# Patient Record
Sex: FEMALE | Race: WHITE | Employment: FULL TIME | URBAN - METROPOLITAN AREA
[De-identification: names, ages, dates, MRNs, and addresses within clinical notes are randomized per-mention and may not be internally consistent; named-entity substitution may affect disease eponyms.]

---

## 2017-02-02 ENCOUNTER — PATIENT MESSAGE (OUTPATIENT)
Dept: INTERNAL MEDICINE CLINIC | Age: 52
End: 2017-02-02

## 2017-11-07 ENCOUNTER — OFFICE VISIT (OUTPATIENT)
Dept: INTERNAL MEDICINE CLINIC | Age: 52
End: 2017-11-07

## 2017-11-07 VITALS
WEIGHT: 134 LBS | DIASTOLIC BLOOD PRESSURE: 81 MMHG | HEART RATE: 73 BPM | SYSTOLIC BLOOD PRESSURE: 133 MMHG | OXYGEN SATURATION: 92 % | HEIGHT: 66 IN | BODY MASS INDEX: 21.53 KG/M2

## 2017-11-07 DIAGNOSIS — M79.644 CHRONIC PAIN OF RIGHT THUMB: ICD-10-CM

## 2017-11-07 DIAGNOSIS — Z13.1 SCREENING FOR DIABETES MELLITUS: ICD-10-CM

## 2017-11-07 DIAGNOSIS — G89.29 CHRONIC PAIN OF RIGHT THUMB: ICD-10-CM

## 2017-11-07 DIAGNOSIS — Z13.220 LIPID SCREENING: ICD-10-CM

## 2017-11-07 DIAGNOSIS — Z00.00 ANNUAL PHYSICAL EXAM: Primary | ICD-10-CM

## 2017-11-07 DIAGNOSIS — G89.29 CHRONIC LEFT-SIDED LOW BACK PAIN WITH LEFT-SIDED SCIATICA: ICD-10-CM

## 2017-11-07 DIAGNOSIS — M54.42 CHRONIC LEFT-SIDED LOW BACK PAIN WITH LEFT-SIDED SCIATICA: ICD-10-CM

## 2017-11-07 DIAGNOSIS — M79.652 LEFT THIGH PAIN: ICD-10-CM

## 2017-11-07 LAB
A/G RATIO: 1.5 (ref 1.1–2.2)
ALBUMIN SERPL-MCNC: 4.7 G/DL (ref 3.4–5)
ALP BLD-CCNC: 84 U/L (ref 40–129)
ALT SERPL-CCNC: 14 U/L (ref 10–40)
ANION GAP SERPL CALCULATED.3IONS-SCNC: 16 MMOL/L (ref 3–16)
AST SERPL-CCNC: 19 U/L (ref 15–37)
BILIRUB SERPL-MCNC: 0.3 MG/DL (ref 0–1)
BUN BLDV-MCNC: 16 MG/DL (ref 7–20)
CALCIUM SERPL-MCNC: 10.4 MG/DL (ref 8.3–10.6)
CHLORIDE BLD-SCNC: 99 MMOL/L (ref 99–110)
CHOLESTEROL, TOTAL: 197 MG/DL (ref 0–199)
CO2: 25 MMOL/L (ref 21–32)
CREAT SERPL-MCNC: 0.6 MG/DL (ref 0.6–1.1)
GFR AFRICAN AMERICAN: >60
GFR NON-AFRICAN AMERICAN: >60
GLOBULIN: 3.2 G/DL
GLUCOSE BLD-MCNC: 85 MG/DL (ref 70–99)
HCT VFR BLD CALC: 40.8 % (ref 36–48)
HDLC SERPL-MCNC: 100 MG/DL (ref 40–60)
HEMOGLOBIN: 13.7 G/DL (ref 12–16)
LDL CHOLESTEROL CALCULATED: 79 MG/DL
MCH RBC QN AUTO: 31.3 PG (ref 26–34)
MCHC RBC AUTO-ENTMCNC: 33.5 G/DL (ref 31–36)
MCV RBC AUTO: 93.6 FL (ref 80–100)
PDW BLD-RTO: 12.4 % (ref 12.4–15.4)
PLATELET # BLD: 289 K/UL (ref 135–450)
PMV BLD AUTO: 8.9 FL (ref 5–10.5)
POTASSIUM SERPL-SCNC: 4.3 MMOL/L (ref 3.5–5.1)
RBC # BLD: 4.36 M/UL (ref 4–5.2)
SODIUM BLD-SCNC: 140 MMOL/L (ref 136–145)
TOTAL PROTEIN: 7.9 G/DL (ref 6.4–8.2)
TRIGL SERPL-MCNC: 89 MG/DL (ref 0–150)
VLDLC SERPL CALC-MCNC: 18 MG/DL
WBC # BLD: 5.6 K/UL (ref 4–11)

## 2017-11-07 PROCEDURE — 99396 PREV VISIT EST AGE 40-64: CPT | Performed by: INTERNAL MEDICINE

## 2017-11-07 PROCEDURE — 99213 OFFICE O/P EST LOW 20 MIN: CPT | Performed by: INTERNAL MEDICINE

## 2017-11-07 ASSESSMENT — PATIENT HEALTH QUESTIONNAIRE - PHQ9
2. FEELING DOWN, DEPRESSED OR HOPELESS: 0
SUM OF ALL RESPONSES TO PHQ9 QUESTIONS 1 & 2: 0
1. LITTLE INTEREST OR PLEASURE IN DOING THINGS: 0
SUM OF ALL RESPONSES TO PHQ QUESTIONS 1-9: 0

## 2017-11-07 NOTE — PATIENT INSTRUCTIONS
Physical Therapy Facilities:  Isaura Leonard PT  [phone] [place] [phone] [facility])    [facility] [phone]    [facility])  [phone] Lavonne Pandey ([address]) [facility]  [phone]    [address] 1330  [phone] appointments [phone])    [facility]  [phone]    Juliana Alarcon PT(myofascial release/fibromyalgia)  [phone]        Recommendations for optimal health:  Be sure you are exercising at least 30 minutes  or 10,000 steps daily. Ideally you should try to get a mix of cardio and strength exercises. Work on [employer] For more detailed information, visit Nutrition Source web site- knowledge for healthy eating from [facility]. [facility]      If your are using supplements, look for \"USP verified\" on the label. Helps to assure they are good quality. Vitamin D 800 units daily. 8 Nutrition Tips for Eating Right:  1. Choose good carbs, not no carbs. Whole grains are your best bet. 2. Pay attention to the protein package. Fish, poultry, nuts, and beans are the best choices. 3. Choose foods with healthy fats, limit foods high in saturated fat, and avoid foods with trans fat. Plant oils, nuts, and fish are the healthiest sources. 4. Choose a fiber-filled diet, rich in whole grains, vegetables, and fruits. 5. Eat more vegetables and fruits. Go for color and varietydark green, yellow, orange, and red. 6. Calcium is important. But milk isnt the only, or even best, source. 7. Water is best to quench your thirst. Skip the sugary drinks, and go easy on the milk and juice. 8. Eating less salt is good for everyones health. Choose more fresh foods and fewer processed foods. Aim for 2-3 cups of vegetables daily and 1 1/2-2 cups of fruits daily.

## 2017-11-07 NOTE — PROGRESS NOTES
History and Physical      Isabelle Contreras  : 1965    Date of Service: 2017    Chief Complaint:   Isabelle Contreras is a 45 y.o. female who presents for complete physical examination. HPI:  Also with complaint about left leg and thigh, and right thumb   Thumb pain, right, for some time. Hard to bend. Clicks. Worse when lays down. Is right handed. Gets stuck sometimes. No injury. Also left leg pain. Improved since stopped wearing orthotics. Had been wearing since  March. Feet good now. Left thigh with deep bone pain at times, bothers most when walks. No numbness or weakness Has improved significantly since stopped using lifts/orthotics. Also with discomfort across low back at times into left buttock and leg. Exercise: works at [employer]. Active at work. Patient's last menstrual period was 2013. Patient Care Team:  Lilly rOozco MD as PCP - General (Internal Medicine)    Health Maintenance   Topic Date Due    Hepatitis C screen  2017    Flu vaccine (1) 2017    Breast cancer screen  2019    DTaP/Tdap/Td vaccine (2 - Td) 2020    Lipid screen  06/10/2020    Cervical cancer screen  2021    Colon cancer screen colonoscopy  2023    HIV screen  Completed       Immunization History   Administered Date(s) Administered    Influenza Virus Vaccine 2011    Tdap (Boostrix, Adacel) 2010       No Known Allergies    Outpatient Prescriptions Marked as Taking for the 17 encounter (Office Visit) with Lilly Orozco MD   Medication Sig Dispense Refill    Naproxen Sodium (ALEVE PO) Take by mouth      aspirin 325 MG tablet Take 325 mg by mouth 3 times daily as needed for Pain      ibuprofen (ADVIL;MOTRIN) 200 MG tablet Take 200 mg by mouth as needed for Pain      Multiple Vitamin (MULTIVITAMIN PO) Take  by mouth daily.          Past Medical History:   Diagnosis Date    Fe deficiency anemia     Fibroids     HSV-2 infection      History

## 2017-11-08 LAB
ESTIMATED AVERAGE GLUCOSE: 108.3 MG/DL
HBA1C MFR BLD: 5.4 %

## 2017-11-08 PROCEDURE — 90471 IMMUNIZATION ADMIN: CPT | Performed by: INTERNAL MEDICINE

## 2017-11-08 PROCEDURE — 90688 IIV4 VACCINE SPLT 0.5 ML IM: CPT | Performed by: INTERNAL MEDICINE

## 2018-11-26 ENCOUNTER — PATIENT MESSAGE (OUTPATIENT)
Dept: INTERNAL MEDICINE CLINIC | Age: 53
End: 2018-11-26

## 2019-10-08 ENCOUNTER — HOSPITAL ENCOUNTER (OUTPATIENT)
Dept: WOMENS IMAGING | Age: 54
Discharge: HOME | End: 2019-10-08
Payer: COMMERCIAL

## 2019-10-08 DIAGNOSIS — Z12.31 VISIT FOR SCREENING MAMMOGRAM: ICD-10-CM

## 2019-10-08 PROCEDURE — 77063 BREAST TOMOSYNTHESIS BI: CPT

## 2019-12-06 ENCOUNTER — OFFICE VISIT (OUTPATIENT)
Dept: INTERNAL MEDICINE CLINIC | Age: 54
End: 2019-12-06
Payer: COMMERCIAL

## 2019-12-06 VITALS
BODY MASS INDEX: 21.5 KG/M2 | TEMPERATURE: 97.9 F | WEIGHT: 137 LBS | DIASTOLIC BLOOD PRESSURE: 86 MMHG | HEART RATE: 79 BPM | HEIGHT: 67 IN | SYSTOLIC BLOOD PRESSURE: 124 MMHG | OXYGEN SATURATION: 94 %

## 2019-12-06 DIAGNOSIS — R04.0 RECURRENT EPISTAXIS: ICD-10-CM

## 2019-12-06 DIAGNOSIS — Z00.00 ANNUAL PHYSICAL EXAM: Primary | ICD-10-CM

## 2019-12-06 DIAGNOSIS — Z13.220 LIPID SCREENING: ICD-10-CM

## 2019-12-06 DIAGNOSIS — Z13.1 SCREENING FOR DIABETES MELLITUS: ICD-10-CM

## 2019-12-06 PROCEDURE — 90471 IMMUNIZATION ADMIN: CPT | Performed by: INTERNAL MEDICINE

## 2019-12-06 PROCEDURE — 99396 PREV VISIT EST AGE 40-64: CPT | Performed by: INTERNAL MEDICINE

## 2019-12-06 PROCEDURE — 90686 IIV4 VACC NO PRSV 0.5 ML IM: CPT | Performed by: INTERNAL MEDICINE

## 2019-12-06 PROCEDURE — 90715 TDAP VACCINE 7 YRS/> IM: CPT | Performed by: INTERNAL MEDICINE

## 2019-12-06 PROCEDURE — 90472 IMMUNIZATION ADMIN EACH ADD: CPT | Performed by: INTERNAL MEDICINE

## 2019-12-19 ENCOUNTER — OFFICE VISIT (OUTPATIENT)
Dept: ENT CLINIC | Age: 54
End: 2019-12-19
Payer: COMMERCIAL

## 2019-12-19 VITALS
HEIGHT: 66 IN | BODY MASS INDEX: 22.02 KG/M2 | HEART RATE: 83 BPM | SYSTOLIC BLOOD PRESSURE: 147 MMHG | DIASTOLIC BLOOD PRESSURE: 91 MMHG | WEIGHT: 137 LBS | TEMPERATURE: 97 F

## 2019-12-19 DIAGNOSIS — R04.0 EPISTAXIS: Primary | ICD-10-CM

## 2019-12-19 PROCEDURE — 99203 OFFICE O/P NEW LOW 30 MIN: CPT | Performed by: OTOLARYNGOLOGY

## 2019-12-19 PROCEDURE — 30901 CONTROL OF NOSEBLEED: CPT | Performed by: OTOLARYNGOLOGY

## 2020-01-23 ENCOUNTER — OFFICE VISIT (OUTPATIENT)
Dept: ENT CLINIC | Age: 55
End: 2020-01-23
Payer: COMMERCIAL

## 2020-01-23 VITALS
HEART RATE: 94 BPM | HEIGHT: 66 IN | DIASTOLIC BLOOD PRESSURE: 94 MMHG | SYSTOLIC BLOOD PRESSURE: 137 MMHG | TEMPERATURE: 96.8 F | BODY MASS INDEX: 21.69 KG/M2 | WEIGHT: 135 LBS

## 2020-01-23 PROCEDURE — 99213 OFFICE O/P EST LOW 20 MIN: CPT | Performed by: OTOLARYNGOLOGY

## 2020-01-23 NOTE — PROGRESS NOTES
Lifestyle    Physical activity:     Days per week: Not on file     Minutes per session: Not on file    Stress: Not on file   Relationships    Social connections:     Talks on phone: Not on file     Gets together: Not on file     Attends Mandaeism service: Not on file     Active member of club or organization: Not on file     Attends meetings of clubs or organizations: Not on file     Relationship status: Not on file    Intimate partner violence:     Fear of current or ex partner: Not on file     Emotionally abused: Not on file     Physically abused: Not on file     Forced sexual activity: Not on file   Other Topics Concern    Not on file   Social History Narrative    Exercise: Active job and tries to walk       DRUG/FOOD ALLERGIES: Patient has no known allergies. CURRENT MEDICATIONS  Prior to Admission medications    Medication Sig Start Date End Date Taking? Authorizing Provider   Cholecalciferol (VITAMIN D3 PO) Take by mouth    Historical Provider, MD   aspirin 325 MG tablet Take 325 mg by mouth 3 times daily as needed for Pain    Historical Provider, MD   ibuprofen (ADVIL;MOTRIN) 200 MG tablet Take 200 mg by mouth as needed for Pain    Historical Provider, MD   Multiple Vitamin (MULTIVITAMIN PO) Take  by mouth daily.     Historical Provider, MD       REVIEW OF SYSTEMS  The following systems were reviewed and revealed the following in addition to any already discussed in the HPI:    CONSTITUTIONAL: no weight loss, no fever, no night sweats, no chills  EYES: no vision changes, no blurry vision  EARS: no changes in hearing, no otalgia  NOSE: Epistaxis  RESPIRATORY: no  Difficulty breathing, no shortness of breath  CV: no chest pain, no Peripheral vascular disease  HEME: No coagulation disorder, no Bleeding disorder  NEURO: no TIA or stroke-like symptoms  SKIN: No new rashes in the head and neck, no recent skin cancers  MOUTH: No new ulcers, no recent teeth infections  GASTROINTESTINAL: No diarrhea, recauterize the area. I have performed a head and neck physical exam personally or was physically present during the key or critical portions of the service. Medical Decision Making:   The following items were considered in medical decision making:  Independent review of images  Review / order clinical lab tests  Review / order radiology tests  Decision to obtain old records

## 2020-11-09 ENCOUNTER — HOSPITAL ENCOUNTER (OUTPATIENT)
Dept: WOMENS IMAGING | Age: 55
Discharge: HOME | End: 2020-11-09
Payer: COMMERCIAL

## 2020-11-09 PROCEDURE — 77063 BREAST TOMOSYNTHESIS BI: CPT

## 2021-03-25 ENCOUNTER — IMMUNIZATION (OUTPATIENT)
Dept: PRIMARY CARE CLINIC | Age: 56
End: 2021-03-25
Payer: COMMERCIAL

## 2021-03-25 PROCEDURE — 0011A PR IMM ADMN SARSCOV2 100 MCG/0.5 ML 1ST DOSE: CPT | Performed by: FAMILY MEDICINE

## 2021-03-25 PROCEDURE — 91301 COVID-19, MODERNA VACCINE 100MCG/0.5ML DOSE: CPT | Performed by: FAMILY MEDICINE

## 2021-04-22 ENCOUNTER — IMMUNIZATION (OUTPATIENT)
Dept: PRIMARY CARE CLINIC | Age: 56
End: 2021-04-22
Payer: COMMERCIAL

## 2021-04-22 PROCEDURE — 91301 COVID-19, MODERNA VACCINE 100MCG/0.5ML DOSE: CPT | Performed by: FAMILY MEDICINE

## 2021-04-22 PROCEDURE — 0012A PR IMM ADMN SARSCOV2 100 MCG/0.5 ML 2ND DOSE: CPT | Performed by: FAMILY MEDICINE

## 2021-08-03 ENCOUNTER — OFFICE VISIT (OUTPATIENT)
Dept: INTERNAL MEDICINE CLINIC | Age: 56
End: 2021-08-03
Payer: COMMERCIAL

## 2021-08-03 VITALS
HEIGHT: 67 IN | DIASTOLIC BLOOD PRESSURE: 74 MMHG | SYSTOLIC BLOOD PRESSURE: 144 MMHG | OXYGEN SATURATION: 100 % | HEART RATE: 94 BPM | BODY MASS INDEX: 20.94 KG/M2 | WEIGHT: 133.4 LBS

## 2021-08-03 DIAGNOSIS — R03.0 ELEVATED BLOOD PRESSURE READING WITHOUT DIAGNOSIS OF HYPERTENSION: ICD-10-CM

## 2021-08-03 DIAGNOSIS — Z00.00 ANNUAL PHYSICAL EXAM: Primary | ICD-10-CM

## 2021-08-03 DIAGNOSIS — Z13.220 LIPID SCREENING: ICD-10-CM

## 2021-08-03 DIAGNOSIS — Z12.4 PAP SMEAR FOR CERVICAL CANCER SCREENING: ICD-10-CM

## 2021-08-03 DIAGNOSIS — Z13.1 SCREENING FOR DIABETES MELLITUS: ICD-10-CM

## 2021-08-03 DIAGNOSIS — Z23 NEED FOR SHINGLES VACCINE: ICD-10-CM

## 2021-08-03 PROCEDURE — 99396 PREV VISIT EST AGE 40-64: CPT | Performed by: INTERNAL MEDICINE

## 2021-08-03 PROCEDURE — 90471 IMMUNIZATION ADMIN: CPT | Performed by: INTERNAL MEDICINE

## 2021-08-03 PROCEDURE — 90750 HZV VACC RECOMBINANT IM: CPT | Performed by: INTERNAL MEDICINE

## 2021-08-03 SDOH — ECONOMIC STABILITY: FOOD INSECURITY: WITHIN THE PAST 12 MONTHS, YOU WORRIED THAT YOUR FOOD WOULD RUN OUT BEFORE YOU GOT MONEY TO BUY MORE.: NEVER TRUE

## 2021-08-03 SDOH — ECONOMIC STABILITY: FOOD INSECURITY: WITHIN THE PAST 12 MONTHS, THE FOOD YOU BOUGHT JUST DIDN'T LAST AND YOU DIDN'T HAVE MONEY TO GET MORE.: NEVER TRUE

## 2021-08-03 ASSESSMENT — PATIENT HEALTH QUESTIONNAIRE - PHQ9
SUM OF ALL RESPONSES TO PHQ QUESTIONS 1-9: 0
2. FEELING DOWN, DEPRESSED OR HOPELESS: 0
SUM OF ALL RESPONSES TO PHQ9 QUESTIONS 1 & 2: 0
1. LITTLE INTEREST OR PLEASURE IN DOING THINGS: 0

## 2021-08-03 ASSESSMENT — SOCIAL DETERMINANTS OF HEALTH (SDOH): HOW HARD IS IT FOR YOU TO PAY FOR THE VERY BASICS LIKE FOOD, HOUSING, MEDICAL CARE, AND HEATING?: NOT HARD AT ALL

## 2021-08-03 NOTE — PATIENT INSTRUCTIONS
yogurt, or 1 ½ ounces of cheese. · Eat 6 to 8 servings of grains each day. A serving is 1 slice of bread, 1 ounce of dry cereal, or ½ cup of cooked rice, pasta, or cooked cereal. Try to choose whole-grain products as much as possible. · Limit lean meat, poultry, and fish to 2 servings each day. A serving is 3 ounces, about the size of a deck of cards. · Eat 4 to 5 servings of nuts, seeds, and legumes (cooked dried beans, lentils, and split peas) each week. A serving is 1/3 cup of nuts, 2 tablespoons of seeds, or ½ cup of cooked beans or peas. · Limit fats and oils to 2 to 3 servings each day. A serving is 1 teaspoon of vegetable oil or 2 tablespoons of salad dressing. · Limit sweets and added sugars to 5 servings or less a week. A serving is 1 tablespoon jelly or jam, ½ cup sorbet, or 1 cup of lemonade. · Eat less than 2,300 milligrams (mg) of sodium a day. If you limit your sodium to 1,500 mg a day, you can lower your blood pressure even more. · Be aware that all of these are the suggested number of servings for people who eat 1,800 to 2,000 calories a day. Your recommended number of servings may be different if you need more or fewer calories. Tips for success  · Start small. Do not try to make dramatic changes to your diet all at once. You might feel that you are missing out on your favorite foods and then be more likely to not follow the plan. Make small changes, and stick with them. Once those changes become habit, add a few more changes. · Try some of the following:  ? Make it a goal to eat a fruit or vegetable at every meal and at snacks. This will make it easy to get the recommended amount of fruits and vegetables each day. ? Try yogurt topped with fruit and nuts for a snack or healthy dessert. ? Add lettuce, tomato, cucumber, and onion to sandwiches. ? Combine a ready-made pizza crust with low-fat mozzarella cheese and lots of vegetable toppings.  Try using tomatoes, squash, spinach, broccoli, carrots, cauliflower, and onions. ? Have a variety of cut-up vegetables with a low-fat dip as an appetizer instead of chips and dip. ? Sprinkle sunflower seeds or chopped almonds over salads. Or try adding chopped walnuts or almonds to cooked vegetables. ? Try some vegetarian meals using beans and peas. Add garbanzo or kidney beans to salads. Make burritos and tacos with mashed de leon beans or black beans. Where can you learn more? Go to [email or web address] org and sign in to your [employer] account. Enter K857 in the [facility] box to learn more about \"DASH Diet: Care Instructions. \"     If you do not have an account, please click on the \"Sign Up Now\" link. Current as of: August 31, 2020               Content Version: 12.9  © 7773-6106 Healthwise, [facility]. Care instructions adapted under license by [facility] ([facility]). If you have questions about a medical condition or this instruction, always ask your healthcare professional. [place] [address] any warranty or liability for your use of this information.

## 2021-08-03 NOTE — PROGRESS NOTES
History and Physical    Karl Gao  : 1965    Date of Service: 8/3/2021    Chief Complaint:   Karl Gao is a 60 y.o. female who presents for complete physical examination. Diet: lots of whole foods, needs more fruits and vegetables. Exercise: works at [employer]. Active at work. Patient's last menstrual period was 2013. Minimal menopausal symptoms, mainly gets warm at night, but manageable. Patient Care Team:  Yogesh Smallwood MD as PCP - General (Internal Medicine)  Yogesh Smallwood MD as PCP - [facility]    Health Maintenance   Topic Date Due    Cervical cancer screen  2021    Flu vaccine (1) 2021    Shingles Vaccine (2 of 2) 2021    Breast cancer screen  2022    Colon cancer screen colonoscopy  2023    Lipid screen  2024    DTaP/Tdap/Td vaccine (3 - Td or Tdap) 2029    COVID-19 Vaccine  Completed    Hepatitis C screen  Completed    HIV screen  Completed    Hepatitis A vaccine  Aged Out    Hepatitis B vaccine  Aged Out    Hib vaccine  Aged Out    Meningococcal (ACWY) vaccine  Aged Out    Pneumococcal 0-64 years Vaccine  Aged [employer] History   Administered Date(s) Administered    COVID-19, Moderna, PF, 100mcg/0.5mL 2021, 2021    Influenza Virus Vaccine 2011    Influenza, Quadv, 6 mo and older, IM (Fluzone, Flulaval) 2017    Influenza, Quadv, IM, PF (6 mo and older Fluzone, Flulaval, Fluarix, and 3 yrs and older Afluria) 2019    Tdap (Boostrix, Adacel) 2010, 2019    Zoster Recombinant (Shingrix) 2021       No Known Allergies    Outpatient Medications Marked as Taking for the 8/3/21 encounter (Office Visit) with Yogesh Smallwood MD   Medication Sig Dispense Refill    BIOTIN PO Take by mouth         Past Medical History:   Diagnosis Date    Fe deficiency anemia     Fibroids     HSV-2 infection      History reviewed.  No pertinent surgical history. Family History   Problem Relation Age of Onset    Hypertension Mother     High Cholesterol Mother     Coronary Art Dis Father         MI, CABG, defibrillator,  at 80 with CHF    High Cholesterol Father     Diabetes Father 80    Colon Polyps Brother     Other Brother         congenital unilateral kidney    Ulcerative Colitis Brother     Crohn's Disease Maternal Aunt      Social History     Socioeconomic History    Marital status:      Spouse name: Not on file    Number of children: 0    Years of education: Not on file    Highest education level: Not on file   Occupational History    Occupation: [employer] sales   Tobacco Use    Smoking status: Never Smoker    Smokeless tobacco: Never Used   Substance and Sexual Activity    Alcohol use: Yes     Alcohol/week: 7.0 standard drinks     Types: 7 Cans of beer per week     Comment: 1-2 beers daily    Drug use: Yes     Comment: Marijuana 1-2x/month    Sexual activity: Yes     Partners: Male   Other Topics Concern    Not on file   Social History Narrative    Exercise: Active job and tries to walk     Social Determinants of Health     Financial Resource Strain: Low Risk     Difficulty of Paying Living Expenses: Not hard at all   Food Insecurity: No Food Insecurity    Worried About Running Out of Food in the Last Year: Never true    [address] in the Last Year: Never true   Transportation Needs:     Lack of Transportation (Medical):      Lack of Transportation (Non-Medical):    Physical Activity:     Days of Exercise per Week:     Minutes of Exercise per Session:    Stress:     Feeling of Stress :    Social Connections:     Frequency of Communication with Friends and Family:     Frequency of Social Gatherings with Friends and Family:     Attends Restorationist Services:     Active Member of Clubs or Organizations:     Attends Club or Organization Meetings:     Marital Status:    Intimate Partner Violence:     Fear of Current or Ex-Partner:     Emotionally Abused:     Physically Abused:     Sexually Abused:        Review of Systems:  As documented in HPI and patient questionnaire (scanned)    Wt Readings from Last 3 Encounters:   08/03/21 133 lb 6.4 oz (60.5 kg)   01/23/20 135 lb (61.2 kg)   12/19/19 137 lb (62.1 kg)     BP Readings from Last 3 Encounters:   08/03/21 (!) 144/74   01/23/20 (!) 137/94   12/19/19 (!) 147/91       Physical Exam:   Vitals:    08/03/21 1419 08/03/21 1427   BP: (!) 146/74 (!) 144/74   Site: Right Upper Arm Left Upper Arm   Pulse: 94    SpO2: 100%    Weight: 133 lb 6.4 oz (60.5 kg)    Height: 5' 7\" (1.702 m)      Body mass index is 20.89 kg/m². Physical Exam  Constitutional:       Appearance: Normal appearance. She is well-developed and normal weight. HENT:      Head: Normocephalic and atraumatic. Right Ear: Tympanic membrane, ear canal and external ear normal.      Left Ear: Tympanic membrane, ear canal and external ear normal.      Nose: No nasal deformity, signs of injury, laceration or mucosal edema. Right Nostril: No epistaxis or septal hematoma. Right Turbinates: Not enlarged. Eyes:      Conjunctiva/sclera: Conjunctivae normal.      Pupils: Pupils are equal, round, and reactive to light. Neck:      Thyroid: No thyromegaly. Cardiovascular:      Rate and Rhythm: Normal rate and regular rhythm. Heart sounds: No murmur heard. Pulmonary:      Effort: Pulmonary effort is normal.      Breath sounds: Normal breath sounds. Chest:      Breasts: Breasts are symmetrical.         Right: No inverted nipple, mass, nipple discharge, skin change or tenderness. Left: No inverted nipple, mass, nipple discharge, skin change or tenderness. Abdominal:      General: Bowel sounds are normal.      Palpations: Abdomen is soft. There is no hepatomegaly, splenomegaly or mass. Tenderness: There is no abdominal tenderness. Genitourinary:     Exam position: Supine.       Vagina:

## 2021-08-03 NOTE — ASSESSMENT & PLAN NOTE
Discussed management with lifestyle modification. .  Suggest home blood pressure cuff for monitoring. Provided info on DASH diet. Encourage stop drinking and determine impact once off for 1 week. Dami Ley

## 2021-08-04 LAB
HPV COMMENT: NORMAL
HPV TYPE 16: NOT DETECTED
HPV TYPE 18: NOT DETECTED
HPVOH (OTHER TYPES): NOT DETECTED

## 2021-12-07 ENCOUNTER — OFFICE VISIT (OUTPATIENT)
Dept: INTERNAL MEDICINE CLINIC | Age: 56
End: 2021-12-07
Payer: COMMERCIAL

## 2021-12-07 VITALS
BODY MASS INDEX: 21.6 KG/M2 | SYSTOLIC BLOOD PRESSURE: 156 MMHG | OXYGEN SATURATION: 99 % | RESPIRATION RATE: 20 BRPM | HEIGHT: 66 IN | DIASTOLIC BLOOD PRESSURE: 86 MMHG | HEART RATE: 99 BPM | WEIGHT: 134.4 LBS

## 2021-12-07 DIAGNOSIS — I10 ESSENTIAL HYPERTENSION: Primary | ICD-10-CM

## 2021-12-07 PROCEDURE — 99213 OFFICE O/P EST LOW 20 MIN: CPT | Performed by: INTERNAL MEDICINE

## 2021-12-07 PROCEDURE — 90750 HZV VACC RECOMBINANT IM: CPT | Performed by: INTERNAL MEDICINE

## 2021-12-07 PROCEDURE — 90471 IMMUNIZATION ADMIN: CPT | Performed by: INTERNAL MEDICINE

## 2021-12-07 RX ORDER — HYDROCHLOROTHIAZIDE 12.5 MG/1
12.5 CAPSULE, GELATIN COATED ORAL DAILY
Qty: 30 CAPSULE | Refills: 3 | Status: SHIPPED | OUTPATIENT
Start: 2021-12-07 | End: 2022-01-06 | Stop reason: SDUPTHER

## 2021-12-07 NOTE — PROGRESS NOTES
Dominic Polanco   :  1965  2021    ASSESSMENT/PLAN:   Essential hypertension  Assessment & Plan: This is a new diagnosis. Persistent despite of optimal management of lifestyle. Start treatment low-dose HCTZ 12.5 mg daily. Discussed risks and benefits of the medication, including most common side effects. Monitor home blood pressure readings and report back in approximately 2 to 3 weeks. Patient also to ask  about any possible apnea. Does snore but no daytime hypersomnolence and body habitus would be atypical for obstructive sleep apnea with BMI of 21. Orders:  -     hydroCHLOROthiazide (MICROZIDE) 12.5 MG capsule; Take 1 capsule by mouth daily, Disp-30 capsule, R-3Normal    Shingrix #2 today. Return in about 2 months (around 2022) for HTN, lab. SUBJECTIVE:  60 y.o. female established patient here for:   Chief Complaint   Patient presents with    Follow-up     check bp, 2nd shingles, flu due      Lowest at home was 737 systolic, and high in 305'G. Mostly 140-150's. Limited alcohol to 1 beer daily. Didn't make a difference. First migraine back in September. Woke up with terrible headache, got worse, episode of vomiting. No since. Still nosebleeds even though cauterized last year. Does snore. Review of Systems    Outpatient Medications Marked as Taking for the 21 encounter (Office Visit) with Linda Easton MD   Medication Sig Dispense Refill    hydroCHLOROthiazide (MICROZIDE) 12.5 MG capsule Take 1 capsule by mouth daily 30 capsule 3    BIOTIN PO Take by mouth      Cholecalciferol (VITAMIN D3 PO) Take by mouth      Multiple Vitamin (MULTIVITAMIN PO) Take  by mouth daily. OBJECTIVE:  Vitals:    21 1232 21 1417   BP: (!) 150/86 (!) 156/86   Pulse: 99    Resp: 20    SpO2: 99%    Weight: 134 lb 6.4 oz (61 kg)    Height: 5' 6\" (1.676 m)      Physical Exam  Constitutional:       Appearance: Normal appearance.    Cardiovascular:      Rate and Rhythm: Normal rate and regular rhythm. Pulmonary:      Effort: Pulmonary effort is normal.      Breath sounds: Normal breath sounds. Musculoskeletal:      Right lower leg: No edema. Left lower leg: No edema. This note was generated completely or in part utilizing Dragon dictation speech recognition software. Occasionally, words are mistranscribed and despite editing, the text may contain inaccuracies due to incorrect word recognition.   If further clarification is needed please contact the office at [phone]  --Sarah Mahoney MD

## 2021-12-14 ENCOUNTER — HOSPITAL ENCOUNTER (OUTPATIENT)
Dept: WOMENS IMAGING | Age: 56
Discharge: HOME | End: 2021-12-14
Payer: COMMERCIAL

## 2021-12-14 DIAGNOSIS — Z12.31 BREAST CANCER SCREENING BY MAMMOGRAM: ICD-10-CM

## 2021-12-14 PROCEDURE — 77063 BREAST TOMOSYNTHESIS BI: CPT

## 2022-01-06 DIAGNOSIS — I10 ESSENTIAL HYPERTENSION: ICD-10-CM

## 2022-01-06 RX ORDER — HYDROCHLOROTHIAZIDE 12.5 MG/1
12.5 CAPSULE, GELATIN COATED ORAL DAILY
Qty: 30 CAPSULE | Refills: 3 | Status: SHIPPED | OUTPATIENT
Start: 2022-01-06 | End: 2022-01-21 | Stop reason: SDUPTHER

## 2022-01-21 ENCOUNTER — TELEPHONE (OUTPATIENT)
Dept: INTERNAL MEDICINE CLINIC | Age: 57
End: 2022-01-21

## 2022-01-21 DIAGNOSIS — I10 ESSENTIAL HYPERTENSION: ICD-10-CM

## 2022-01-21 RX ORDER — HYDROCHLOROTHIAZIDE 12.5 MG/1
12.5 CAPSULE, GELATIN COATED ORAL DAILY
Qty: 90 CAPSULE | Refills: 1 | Status: SHIPPED | OUTPATIENT
Start: 2022-01-21 | End: 2022-06-24

## 2022-01-21 NOTE — TELEPHONE ENCOUNTER
Placed call to pt to ask if she gets rx filled at [address] or [place]. It was refilled on 1/6/22.

## 2022-02-15 ENCOUNTER — OFFICE VISIT (OUTPATIENT)
Dept: INTERNAL MEDICINE CLINIC | Age: 57
End: 2022-02-15
Payer: COMMERCIAL

## 2022-02-15 VITALS
WEIGHT: 135.6 LBS | BODY MASS INDEX: 21.89 KG/M2 | OXYGEN SATURATION: 98 % | DIASTOLIC BLOOD PRESSURE: 80 MMHG | SYSTOLIC BLOOD PRESSURE: 130 MMHG | RESPIRATION RATE: 14 BRPM | HEART RATE: 64 BPM

## 2022-02-15 DIAGNOSIS — I10 ESSENTIAL HYPERTENSION: Primary | ICD-10-CM

## 2022-02-15 PROCEDURE — 99213 OFFICE O/P EST LOW 20 MIN: CPT | Performed by: INTERNAL MEDICINE

## 2022-02-15 RX ORDER — TRIAMTERENE AND HYDROCHLOROTHIAZIDE 37.5; 25 MG/1; MG/1
1 CAPSULE ORAL EVERY MORNING
Qty: 90 CAPSULE | Refills: 0 | Status: SHIPPED | OUTPATIENT
Start: 2022-02-15 | End: 2022-02-25 | Stop reason: SDUPTHER

## 2022-02-15 NOTE — PROGRESS NOTES
Niurka Hercules   :  1965  2/15/2022    ASSESSMENT/PLAN:   Essential hypertension  Assessment & Plan:   Improved, but not at goal yet. DC HCTZ 12.5. Start Maxide 25. Asked patient to send blood pressure readings in 2 to 3 weeks. Return in about 6 months (around 8/15/2022) for CPE. SUBJECTIVE:  60 y.o. female established patient here for:   Chief Complaint   Patient presents with    Follow-up     blood pressure ck      Was started on hctz at last appointment for hypertension. Home readings show blood pressure in yellow zone\", she thinks 172P systolic. Once in green. No side effect with medication    Review of Systems    Outpatient Medications Marked as Taking for the 2/15/22 encounter (Office Visit) with Alex Fitch MD   Medication Sig Dispense Refill    GARLIC PO Take 1 tablet by mouth daily      triamterene-hydroCHLOROthiazide (DYAZIDE) 37.5-25 MG per capsule Take 1 capsule by mouth every morning 90 capsule 0    hydroCHLOROthiazide (MICROZIDE) 12.5 MG capsule Take 1 capsule by mouth daily 90 capsule 1    Cholecalciferol (VITAMIN D3 PO) Take by mouth      Multiple Vitamin (MULTIVITAMIN PO) Take  by mouth daily. OBJECTIVE:  Vitals:    02/15/22 1259 02/15/22 1318   BP: 138/80 130/80   Site: Right Upper Arm Right Upper Arm   Pulse: 64    Resp: 14    SpO2: 98%    Weight: 135 lb 9.6 oz (61.5 kg)      Physical Exam  Constitutional:       Appearance: Normal appearance. Cardiovascular:      Rate and Rhythm: Normal rate and regular rhythm. Pulmonary:      Effort: Pulmonary effort is normal.      Breath sounds: Normal breath sounds. Musculoskeletal:      Right lower leg: No edema. Left lower leg: No edema. This note was generated completely or in part utilizing Dragon dictation speech recognition software. Occasionally, words are mistranscribed and despite editing, the text may contain inaccuracies due to incorrect word recognition.   If further clarification is needed please contact the office at [phone]  --Eloise Melissa MD

## 2022-02-25 RX ORDER — TRIAMTERENE AND HYDROCHLOROTHIAZIDE 37.5; 25 MG/1; MG/1
1 CAPSULE ORAL EVERY MORNING
Qty: 90 CAPSULE | Refills: 1 | Status: SHIPPED | OUTPATIENT
Start: 2022-02-25 | End: 2022-08-22

## 2022-06-23 ENCOUNTER — OFFICE VISIT (OUTPATIENT)
Dept: INTERNAL MEDICINE CLINIC | Age: 57
End: 2022-06-23
Payer: COMMERCIAL

## 2022-06-23 VITALS
OXYGEN SATURATION: 98 % | DIASTOLIC BLOOD PRESSURE: 72 MMHG | BODY MASS INDEX: 21.79 KG/M2 | WEIGHT: 135.6 LBS | HEART RATE: 65 BPM | HEIGHT: 66 IN | SYSTOLIC BLOOD PRESSURE: 122 MMHG

## 2022-06-23 DIAGNOSIS — R21 RASH: Primary | ICD-10-CM

## 2022-06-23 PROCEDURE — 99213 OFFICE O/P EST LOW 20 MIN: CPT | Performed by: INTERNAL MEDICINE

## 2022-06-23 RX ORDER — METHYLPREDNISOLONE 4 MG/1
TABLET ORAL
Qty: 1 KIT | Refills: 0 | Status: SHIPPED | OUTPATIENT
Start: 2022-06-23 | End: 2022-06-29

## 2022-06-23 ASSESSMENT — PATIENT HEALTH QUESTIONNAIRE - PHQ9
1. LITTLE INTEREST OR PLEASURE IN DOING THINGS: 0
2. FEELING DOWN, DEPRESSED OR HOPELESS: 0
SUM OF ALL RESPONSES TO PHQ QUESTIONS 1-9: 0
SUM OF ALL RESPONSES TO PHQ QUESTIONS 1-9: 0
SUM OF ALL RESPONSES TO PHQ9 QUESTIONS 1 & 2: 0
SUM OF ALL RESPONSES TO PHQ QUESTIONS 1-9: 0
SUM OF ALL RESPONSES TO PHQ QUESTIONS 1-9: 0

## 2022-06-23 NOTE — PROGRESS NOTES
Dino Mahoney (:  1965) is a 59 y.o. female,Established patient, here for evaluation of the following chief complaint(s):  Rash (arms & legs  x couple weeks )      Vitals:    22 1355   BP: 122/72   Pulse: 65   SpO2: 98%        ASSESSMENT/PLAN:  1. Rash  Unclear etiology of patient's rash. Patient denies any new products. Will obtain labs to evaluate for possible vasculitis. We will start Medrol Dosepak. If no improvement will need follow-up with PCP or rheumatology   -     Comprehensive Metabolic Panel; Future  -     CBC with Auto Differential; Future  -     NICOLE Reflex to Antibody Cascade; Future    PRN  SUBJECTIVE/OBJECTIVE:  HPI     Patient is a 61-year-old female with past medical history of hypertension who presents secondary to rash. Patient noted that about 1 week ago the tops of arms were slightly itchy. She tried lotion and then noticed some red blochy spots on her legs. Then also noted them on her arms. she notes then it moved down her legs to her feet. Notes the only place where she had itching was on the arms bilaterally. Legs have not been itchy. She denies any rash on her abdomen chest or back. No one else with rash within the home. Patient denies using any new soaps, detergents or any other new products. Patient is feeling well otherwise without any complaints. Review of Systems ROS negative except for those noted in the HPI above. Vitals:    22 1355   BP: 122/72   Pulse: 65   SpO2: 98%     Physical Exam  Constitutional:       Appearance: Normal appearance. HENT:      Head: Normocephalic and atraumatic. Right Ear: External ear normal.      Left Ear: External ear normal.   Eyes:      Extraocular Movements: Extraocular movements intact. Conjunctiva/sclera: Conjunctivae normal.   Cardiovascular:      Rate and Rhythm: Normal rate and regular rhythm. Heart sounds: No murmur heard. No friction rub. No gallop.     Pulmonary:      Effort: Pulmonary effort is normal. No respiratory distress. Breath sounds: Normal breath sounds. No wheezing, rhonchi or rales. Abdominal:      General: Bowel sounds are normal. There is no distension. Palpations: Abdomen is soft. There is no mass. Tenderness: There is no abdominal tenderness. There is no guarding. Skin:     Findings: Rash (pinpoint non palpable, non blanching macules on the upper and lower extremities bilaterally) present. Neurological:      General: No focal deficit present. Mental Status: She is alert and oriented to person, place, and time. Psychiatric:         Mood and Affect: Mood normal.         Behavior: Behavior normal.           An electronic signature was used to authenticate this note.     --Len Duncan, DO

## 2022-06-23 NOTE — Clinical Note
Will likely need follow up with you. Rash was interesting. Seem to be non blanching in nature. Started itchy in the arms, but now not itchy at all. I was slightly concerned for a vasculitis given no exposures to anything recently and the non blanching.

## 2022-07-03 PROBLEM — R76.8 POSITIVE ANA (ANTINUCLEAR ANTIBODY): Status: ACTIVE | Noted: 2022-07-03

## 2022-08-15 NOTE — PROGRESS NOTES
Meningococcal (ACWY) vaccine  Aged Out    Pneumococcal 0-64 years Vaccine  Aged [employer] History   Administered Date(s) Administered    COVID-19, MODERNA BLUE border, Primary or Immunocompromised, (age 12y+), IM, 100 mcg/0.5mL 2021, 2021, 2021    Influenza Virus Vaccine 2011    Influenza, FLUARIX, FLULAVAL, (age 10 mo+) AND AFLURIA, Rudi Barker (age 1 y+), PF 2019    Influenza, Hari Staple, 6 mo and older, IM (Fluzone, Flulaval) 2017    Tdap (Boostrix, Adacel) 2010, 2019    Zoster Recombinant (Shingrix) 2021, 2021       Past Medical History:   Diagnosis Date    Fe deficiency anemia     Fibroids     HSV-2 infection        Outpatient Medications Marked as Taking for the 22 encounter (Office Visit) with Pete Pichardo MD   Medication Sig Dispense Refill    triamterene-hydroCHLOROthiazide (DYAZIDE) 37.5-25 MG per capsule Take 1 capsule by mouth every morning 90 capsule 1    GARLIC PO Take 1 tablet by mouth daily      BIOTIN PO Take by mouth       Cholecalciferol (VITAMIN D3 PO) Take by mouth      Multiple Vitamin (MULTIVITAMIN PO) Take  by mouth daily. No Known Allergies    History reviewed. No pertinent surgical history. Social History     Tobacco Use    Smoking status: Never    Smokeless tobacco: Never   Substance Use Topics    Alcohol use: Yes     Alcohol/week: 7.0 standard drinks     Types: 7 Cans of beer per week     Comment: 1-2 beers daily    Drug use: Yes     Comment: Marijuana 1-2x/month        Family History   Problem Relation Age of Onset    Hypertension Mother     High Cholesterol Mother     Coronary Art Dis Father         MI, CABG, defibrillator,  at 80 with CHF    High Cholesterol Father     Diabetes Father 80    Colon Polyps Brother     Other Brother         congenital unilateral kidney    Ulcerative Colitis Brother     Crohn's Disease Maternal Aunt      Review of Systems   Constitutional: Negative. HENT: Negative. Eyes: Negative. Respiratory: Negative. Cardiovascular: Negative. Gastrointestinal: Negative. Genitourinary: Negative. Skin: Negative. Neurological: Negative. Psychiatric/Behavioral: Negative. Wt Readings from Last 5 Encounters:   08/16/22 132 lb (59.9 kg)   06/23/22 135 lb 9.6 oz (61.5 kg)   02/15/22 135 lb 9.6 oz (61.5 kg)   12/07/21 134 lb 6.4 oz (61 kg)   08/03/21 133 lb 6.4 oz (60.5 kg)     Vitals:    08/16/22 1431   BP: 124/82   Site: Right Upper Arm   Position: Sitting   Cuff Size: Medium Adult   Pulse: 78   Temp: 97.6 °F (36.4 °C)   TempSrc: Temporal   SpO2: 95%   Weight: 132 lb (59.9 kg)   Height: 5' 6\" (1.676 m)     Estimated body mass index is 21.31 kg/m² as calculated from the following:    Height as of this encounter: 5' 6\" (1.676 m). Weight as of this encounter: 132 lb (59.9 kg). Physical Exam  Constitutional:       Appearance: Normal appearance. She is well-developed. HENT:      Right Ear: Tympanic membrane, ear canal and external ear normal.      Left Ear: Tympanic membrane, ear canal and external ear normal.   Eyes:      Conjunctiva/sclera: Conjunctivae normal.      Pupils: Pupils are equal, round, and reactive to light. Neck:      Thyroid: No thyromegaly (small nodule lateral aspect right lobe). Vascular: No carotid bruit. Cardiovascular:      Rate and Rhythm: Normal rate and regular rhythm. Pulses: Normal pulses. Heart sounds: Normal heart sounds. No murmur heard. Pulmonary:      Effort: Pulmonary effort is normal.      Breath sounds: Normal breath sounds. Abdominal:      General: Bowel sounds are normal.      Palpations: Abdomen is soft. There is no mass. Tenderness: There is no abdominal tenderness. Musculoskeletal:      Right lower leg: No edema. Left lower leg: No edema. Lymphadenopathy:      Cervical: No cervical adenopathy. Skin:     General: Skin is warm and dry. Coloration: Skin is not pale.       Comments: Solar lentigines. No suspicious skin lesions   Neurological:      General: No focal deficit present. Mental Status: She is alert. Psychiatric:         Mood and Affect: Mood normal.       No flowsheet data found. Lab Results   Component Value Date/Time    CHOL 199 08/03/2021 03:25 PM    TRIG 129 08/03/2021 03:25 PM    HDL 82 08/03/2021 03:25 PM    HDL 80 05/02/2012 09:35 AM    LDLCALC 91 08/03/2021 03:25 PM    GLUCOSE 91 08/16/2022 03:18 PM    LABA1C 5.4 08/03/2021 03:25 PM       The 10-year ASCVD risk score (Negin Santiago et al., 2013) is: 2.8%    Values used to calculate the score:      Age: 59 years      Sex: Female      Is Non- : No      Diabetic: No      Tobacco smoker: No      Systolic Blood Pressure: 114 mmHg      Is BP treated: Yes      HDL Cholesterol: 82 mg/dL      Total Cholesterol: 199 mg/dL      An electronic signature was used to authenticate this note.     --Charlene Miller MD

## 2022-08-16 ENCOUNTER — OFFICE VISIT (OUTPATIENT)
Dept: INTERNAL MEDICINE CLINIC | Age: 57
End: 2022-08-16
Payer: COMMERCIAL

## 2022-08-16 VITALS
HEIGHT: 66 IN | SYSTOLIC BLOOD PRESSURE: 124 MMHG | DIASTOLIC BLOOD PRESSURE: 82 MMHG | WEIGHT: 132 LBS | OXYGEN SATURATION: 95 % | BODY MASS INDEX: 21.21 KG/M2 | TEMPERATURE: 97.6 F | HEART RATE: 78 BPM

## 2022-08-16 DIAGNOSIS — E04.1 THYROID NODULE: ICD-10-CM

## 2022-08-16 DIAGNOSIS — I10 ESSENTIAL HYPERTENSION: ICD-10-CM

## 2022-08-16 DIAGNOSIS — R21 RASH: ICD-10-CM

## 2022-08-16 DIAGNOSIS — Z00.00 WELLNESS EXAMINATION: Primary | ICD-10-CM

## 2022-08-16 PROCEDURE — 99396 PREV VISIT EST AGE 40-64: CPT | Performed by: INTERNAL MEDICINE

## 2022-08-16 SDOH — ECONOMIC STABILITY: FOOD INSECURITY: WITHIN THE PAST 12 MONTHS, THE FOOD YOU BOUGHT JUST DIDN'T LAST AND YOU DIDN'T HAVE MONEY TO GET MORE.: NEVER TRUE

## 2022-08-16 SDOH — ECONOMIC STABILITY: FOOD INSECURITY: WITHIN THE PAST 12 MONTHS, YOU WORRIED THAT YOUR FOOD WOULD RUN OUT BEFORE YOU GOT MONEY TO BUY MORE.: NEVER TRUE

## 2022-08-16 ASSESSMENT — SOCIAL DETERMINANTS OF HEALTH (SDOH): HOW HARD IS IT FOR YOU TO PAY FOR THE VERY BASICS LIKE FOOD, HOUSING, MEDICAL CARE, AND HEATING?: NOT HARD AT ALL

## 2022-08-16 NOTE — PATIENT INSTRUCTIONS
Recommendations for optimal health:  Be sure you are exercising at least 30 minutes  or 10,000 steps daily. Ideally you should try to get a mix of cardio and strength exercises. Work on [employer] For more detailed information, visit Nutrition Source web site- knowledge for healthy eating from [address] [place] [address]. [facility]      If your are using supplements, look for \"USP verified\" on the label. Helps to assure they are good quality. Vitamin D 800 units daily. Calcium intake - try for 800-1200 mg of calcium in combination of diet and supplements. You can read on this in much more detail on nutritionsellpointse. org    8 Nutrition Tips for Eating Right:  1. Choose good carbs, not no carbs. Whole grains are your best bet. 2. Pay attention to the protein package. Fish, poultry, nuts, and beans are the best choices. 3. Choose foods with healthy fats, limit foods high in saturated fat, and avoid foods with trans fat. Plant oils, nuts, and fish are the healthiest sources. 4. Choose a fiber-filled diet, rich in whole grains, vegetables, and fruits. 5. Eat more vegetables and fruits. Go for color and variety--dark green, yellow, orange, and red. 6. Calcium is important. But milk isnt the only, or even best, source. 7. Water is best to quench your thirst. Skip the sugary drinks, and go easy on the milk and juice. 8. Eating less salt is good for everyones health. Choose more fresh foods and fewer processed foods. Aim for 2-3 cups of vegetables daily and 1 1/2-2 cups of fruits daily.

## 2022-08-17 PROBLEM — E04.1 THYROID NODULE: Status: ACTIVE | Noted: 2022-08-17

## 2022-08-17 ASSESSMENT — ENCOUNTER SYMPTOMS
EYES NEGATIVE: 1
GASTROINTESTINAL NEGATIVE: 1
RESPIRATORY NEGATIVE: 1

## 2022-08-22 RX ORDER — TRIAMTERENE AND HYDROCHLOROTHIAZIDE 37.5; 25 MG/1; MG/1
CAPSULE ORAL
Qty: 90 CAPSULE | Refills: 3 | Status: SHIPPED | OUTPATIENT
Start: 2022-08-22

## 2022-08-23 ENCOUNTER — HOSPITAL ENCOUNTER (OUTPATIENT)
Dept: ULTRASOUND IMAGING | Age: 57
Discharge: HOME | End: 2022-08-23
Payer: COMMERCIAL

## 2022-08-23 DIAGNOSIS — E04.1 THYROID NODULE: ICD-10-CM

## 2022-08-23 PROCEDURE — 76536 US EXAM OF HEAD AND NECK: CPT

## 2023-04-20 ENCOUNTER — HOSPITAL ENCOUNTER (OUTPATIENT)
Dept: WOMENS IMAGING | Age: 58
Discharge: HOME | End: 2023-04-20
Payer: COMMERCIAL

## 2023-04-20 DIAGNOSIS — Z12.31 VISIT FOR SCREENING MAMMOGRAM: ICD-10-CM

## 2023-04-20 PROCEDURE — 77067 SCR MAMMO BI INCL CAD: CPT

## 2023-08-17 RX ORDER — TRIAMTERENE AND HYDROCHLOROTHIAZIDE 37.5; 25 MG/1; MG/1
CAPSULE ORAL
Qty: 90 CAPSULE | Refills: 0 | Status: SHIPPED | OUTPATIENT
Start: 2023-08-17

## 2023-08-17 NOTE — TELEPHONE ENCOUNTER
-continue with OTC pain relievers & cough drops per label instructions  -symptomatic care as discussed/outlined in AVS    Follow-up with primary care provider in the next 2-3 days for re-evaluation. If NEW symptoms develop, please contact primary care provider or follow-up with the [facility]. Seek immediate medical attention at the nearest Emergency Room as discussed and/or indicated/outlined below in AVS.    Please review additional instructions as documented in the AVS below.    Thank you for visiting [facility].      Patient Education     Pharyngitis (Sore Throat), Report Pending     Pharyngitis (sore throat) is often due to a virus. It can also be caused by strep (streptococcus) bacteria. This is often called strep throat. Both viral and strep infections can cause throat pain that is worse when swallowing, aching all over, headache, and fever. Both types of infections are contagious. They may be spread by coughing, kissing, or touching others after touching your mouth or nose.   A test has been done to find out if you or your child have strep throat. Often a rapid strep test can be done which gives immediate results and treatment can begin right away. A throat culture may also be done. The culture results take longer. If you have a virus, the culture results will be negative. The facility will call with your culture results. Call this facility or your healthcare provider if you were not given your rapid test results for strep. If a test is positive for strep infection, you will need to take an antibiotic. An antibiotic is a medicine used to treat a bacterial infection. A prescription can be called into your pharmacy or a written copy will be given to you at that time. If both tests are negative, you likely have a virus, usually viral pharyngitis. This does not need to be treated with antibiotics. Until you receive the results of the rapid strep test or the throat culture, you should stay home from work. If  Last appointment: 8/16/2022  Next appointment: 9/26/2023  Last refill: 8/22/2023 your child is being tested, he or she should stay home from school.   Home care  · Rest at home. Drink plenty of fluids so you won't get dehydrated.  · If the test is positive for strep, you or your child should not go to work or school for the first 24 hours of taking the antibiotics or as directed by the healthcare provider. After this time, you or your child will not be contagious. You or your child can then return to work or school when feeling better or as directed by this facility or your healthcare provider.   · Use the antibiotic medicine (often penicillin or amoxicillin) for the full 10 days or as directed by the healthcare provider. Don't stop the medicine even if you or your child feel better. This is very important to make sure the infection is fully treated. It's also important to prevent medicine-resistant germs from growing. Treatment for 10 days is also the best way to prevent rheumatic fever which affects the heart and other parts of the body. If you or your child were given an antibiotic shot, the healthcare provider will tell you if additional antibiotics are needed.  · Use throat lozenges or numbing throat sprays to help reduce pain. Gargling with warm salt water will also help reduce throat pain. Dissolve 1/2 teaspoon of salt in 1 glass of warm water. Discuss this treatment with your healthcare provider.  · Children can sip on juice or ice pops. Children 5 years and older can also suck on a lollipop or hard candy.  · Don't eat salty or spicy foods or give them to your child. These can irritate the throat.  Other medicine for a child:  You can give your child acetaminophen for fever, fussiness, or discomfort. In babies over 6 months of age, you may use ibuprofen instead of acetaminophen. If your child has chronic liver or kidney disease or ever had a stomach ulcer or gastrointestinal bleeding, talk with your child’s healthcare provider before giving these medicines. Aspirin should never be used  by any child under 18 years of age who has a fever. It may cause severe liver damage. Always contact your child's healthcare provider before giving him or her over-the-counter medicines for the first time.   Other medicine for an adult: You may use acetaminophen or ibuprofen to control pain or fever, unless another medicine was prescribed for this. If you have chronic liver or kidney disease or ever had a stomach ulcer or gastrointestinal bleeding, talk with your healthcare provider before using these medicines.   Follow-up care  Follow up with your healthcare provider or our staff if you or your child don't feel or get better within 72 hours or as directed.   When to get medical advice  Call your healthcare provider right away if any of these occur:   · Your child has a fever (see \"Fever and children,\" below)  · You have a fever of 100.4°F (38°C) or higher, or as directed  · New or worsening ear pain, sinus pain, or headache  · Painful lumps in the back of neck  · Stiff or swollen neck  · Lymph nodes are getting larger or swelling of the neck  · Can’t open mouth wide due to throat pain  · Signs of dehydration, such as very dark urine or no urine or sunken eyes  · Noisy breathing  · Muffled voice  · New rash  · Symptoms are worse  · New symptoms develop  Call 911  Call 911 if you have any of the following symptoms   · Can't swallow, especially saliva, with a lot of drooling  · Trouble or difficulty breathing or wheezing  · Feeling faint or dizzy  · Can't talk  · Feeling of doom  Prevention  Here are steps you can take to help prevent an infection:   · Keep good hand washing habits.  · Don’t have close contact with people who have sore throats, colds, or other upper respiratory infections.  · Don’t smoke, and stay away from secondhand smoke.  · Stay up to date with of your vaccines.  Fever and children  Use a digital thermometer to check your child’s temperature. Don’t use a mercury thermometer. There are different  kinds and uses of digital thermometers. They include:   · Rectal. For children younger than 3 years, a rectal temperature is the most accurate.  · Forehead (temporal). This works for children age 3 months and older. If a child under 3 months old has signs of illness, this can be used for a first pass. The provider may want to confirm with a rectal temperature.  · Ear (tympanic). Ear temperatures are accurate after 6 months of age, but not before.  · Armpit (axillary). This is the least reliable but may be used for a first pass to check a child of any age with signs of illness. The provider may want to confirm with a rectal temperature.  · Mouth (oral). Don’t use a thermometer in your child’s mouth until he or she is at least 4 years old.  Use the rectal thermometer with care. Follow the product maker’s directions for correct use. Insert it gently. Label it and make sure it’s not used in the mouth. It may pass on germs from the stool. If you don’t feel OK using a rectal thermometer, ask the healthcare provider what type to use instead. When you talk with any healthcare provider about your child’s fever, tell him or her which type you used.   Below are guidelines to know if your young child has a fever. Your child’s healthcare provider may give you different numbers for your child. Follow your provider’s specific instructions.   Fever readings for a baby under 3 months old:   · First, ask your child’s healthcare provider how you should take the temperature.  · Rectal or forehead: 100.4°F (38°C) or higher  · Armpit: 99°F (37.2°C) or higher  Fever readings for a child age 3 months to 36 months (3 years):   · Rectal, forehead, or ear: 102°F (38.9°C) or higher  · Armpit: 101°F (38.3°C) or higher  Call the healthcare provider in these cases:  · Repeated temperature of 104°F (40°C) or higher in a child of any age  · Fever of 100.4° (38°C) or higher in a baby younger than 3 months  · Fever that lasts more than 24 hours in a  child under age 2  · Fever that lasts for 3 days in a child age 2 or older    [employer] last reviewed this educational content on 2/1/2020 © 2000-2021 [employer]. All rights reserved. This information is not intended as a substitute for professional medical care. Always follow your healthcare professional's instructions.           Patient Education   Home  Back    Mono Test  At a Glance  Also known as  Mononucleosis spot test, Mononuclear heterophile test, Heterophile antibody test, Monospot  Formal Name  Heterophile antibody titer  Related tests  Erik-Barr virus (EBV) antibodies, Complete blood count (CBC)  Why get tested?  To get screened for mononucleosis  When to get tested?  If you have symptoms of mononucleosis, including fever, sore throat, swollen glands, and fatigue  Sample required  A blood sample drawn from a vein in your arm  Test Sample  What is being tested?  The mono test detects heterophile antibodies, which are made by the body in response to an infection by Erik-Barr virus (EBV). EBV is very common. According to the National Center for Infectious Diseases (NCID), as many as 95% of people in the [place] will have been infected by EBV by the time they are 40 years old. It is spread from person to person through the saliva during close contact. Most of the time, the infection occurs in childhood and causes few or no symptoms, but when it first occurs in adolescence, it can cause infectious mononucleosis (mono) in about half of those infected. Although anyone can have mono, most cases of it are found in adolescence and its prevalence is highest in populations of young people, such as are found at high schools, colleges, or in the .  Mononucleosis is usually a self-limiting condition. It is characterized by the presence of atypical white blood cells (usually reported as reactive lymphocytes), heterophile antibodies, and EBV antibodies in an infected person. Patients  who have mono typically have a fever, sore throat, swollen glands, and fatigue. Many will also have an enlarged spleen and a few may have an enlarged liver. Symptoms of the infection usually arise about one month after infection and may last for several weeks. The associated fatigue may last for several months.  About 70% - 80% of patients with mono produce heterophile antibodies. These proteins are not specific for EBV but, when found in adolescents in conjunction with mono symptoms, are a good screen for EBV infection.  How is the sample collected for testing?  A blood sample is drawn by needle from a vein in the arm.  The Test  How is it used?  The mono test is used to help determine whether a patient has infectious mononucleosis. It is frequently ordered along with a CBC (complete blood count). The CBC is used to determine whether the number of white blood cells (WBCs) is elevated and whether a significant number of reactive lymphocytes is present. A strep test may be ordered with the mono test to determine whether a person’s sore throat is due to a streptococcal infection instead of or in addition to mononucleosis.  If the mono test is initially negative but the doctor still suspects mono, he may order a repeat test in a week or so to see if heterophile antibodies have developed and/or order EBV antibodies to help confirm or rule out the presence of a current EBV infection.  When is it ordered?  The mono test is primarily ordered when an adolescent patient has symptoms such as fever, headache, swollen glands, and fatigue that the doctor suspects are due to infectious mononucleosis. The test may be repeated when it is initially negative but suspicion of mono remains high.  What does the test result mean?  If a patient has a positive mono test, an increased number of white blood cells, reactive lymphocytes, and symptoms of mononucleosis, then they will be diagnosed with infectious mononucleosis. If symptoms and  reactive lymphocytes are present but the mono test is negative, then it may be too early to detect the heterophile antibodies or the affected patient may be in the small number of people who do not make heterophile antibodies. Other EBV antibodies and/or a repeat mono test may be performed to help confirm or rule out the mononucleosis diagnosis.  Most infants and young children will not make heterophile antibodies, so they will have negative mono tests even when infected with EBV. This population is rarely tested, however, because they do not usually have symptoms of infectious mononucleosis.  Patients with negative mono tests and few or no reactive lymphocytes may be infected by another microorganism that is causing mono-like symptoms (such as a cytomegalovirus (CMV) or toxoplasmosis). If the infection occurs during pregnancy, it can be important to determine the cause, as some of the mono-like infections (but not EBV infection) have been associated with pregnancy complications and damage to the fetus. It is also important to identify strep throat, whenever present, because it should be treated promptly with antibiotics.  Is there anything else I should know?  The mono test is rapid and easy to perform, but it is specific for heterophile antibodies, not EBV. It can also be positive in patients with lymphoma, systemic lupus erythematosus (lupus), and some gastrointestinal cancers (although it is not used as a diagnostic or screening tool in these patients).  When the mono test is negative and/or the doctor wants to obtain more information about the presence and status of an EBV infection, he may order one or more of a combination of EBV antibodies. These tests can indicate whether a person is susceptible to EBV, has had a recent infection, has had EBV infection in the past, or has a reactivated EBV infection.  heterophile antibodies decline after the fourth week of illness, and the mono test will become negative as  the infection resolves.  Common Questions  1.  How serious is a case of infectious mononucleosis?  The symptoms of the disease usually resolve without treatment in one to four months. Sometimes, your spleen or liver may enlarge, and you may have to limit your activity until these organs return to normal size. Heart problems or involvement of the central nervous system occur only rarely. Infectious mononucleosis may cause severe liver failure in males with a special XLP gene. In this rare case, mononucleosis can be fatal.  2. Is mononucleosis really a “kissing disease”?  The spread of EBV requires intimate contact with the saliva (found in the mouth) of an infected person. Kissing does not have to occur for infection to arise, however. Saliva on water bottles, toothbrushes, drinking glasses, etc. or hands also can transmit the virus. Transmission of this virus through the air or blood does not normally occur. The incubation period, or the time from infection to appearance of symptoms, ranges from 4 to 6 weeks.  People who have infectious mononucleosis may be able to spread the infection to others for a period of weeks. Many healthy people can carry and spread the virus intermittently for life, and testing them for the virus is not practical. For this reason, it is almost impossible to prevent spreading the virus.  3. Does the Erik-Barr virus cause chronic fatigue syndrome?  There is no laboratory evidence indicating that EBV infection causes chronic fatigue syndrome. For more information, visit the Centers for Disease Control and Prevention's website.  4. Can I get infectious mononucleosis more than once?  Although the symptoms of infectious mononucleosis usually go away in 1 or 2 months, EBV remains inactive in a few cells in the body for the rest of the person's life. Periodically, the virus can reactivate, and it is commonly found in the saliva of infected persons. This reactivation usually occurs without  symptoms of illness.  5. Can EBV cause other serious illnesses?  EBV has been linked to certain cancers, such as Burkitt’s lymphoma, Hodgkin’s disease, nasopharyngeal carcinoma, and AIDS-related lymphoma, and continues to be studied for possible linkages to other cancers. The incidence of these diseases is rare, and cases of Burkitt’s lymphoma and nasopharyngeal carcinoma occur predominantly outside of the [place].      ©7776-2742 [employer]

## 2023-09-25 ASSESSMENT — ENCOUNTER SYMPTOMS
RESPIRATORY NEGATIVE: 1
EYES NEGATIVE: 1
GASTROINTESTINAL NEGATIVE: 1

## 2023-09-26 ENCOUNTER — OFFICE VISIT (OUTPATIENT)
Dept: INTERNAL MEDICINE CLINIC | Age: 58
End: 2023-09-26
Payer: COMMERCIAL

## 2023-09-26 VITALS
BODY MASS INDEX: 21.25 KG/M2 | SYSTOLIC BLOOD PRESSURE: 140 MMHG | WEIGHT: 135.4 LBS | HEART RATE: 82 BPM | OXYGEN SATURATION: 99 % | DIASTOLIC BLOOD PRESSURE: 74 MMHG | HEIGHT: 67 IN

## 2023-09-26 DIAGNOSIS — R53.83 FATIGUE, UNSPECIFIED TYPE: ICD-10-CM

## 2023-09-26 DIAGNOSIS — Z00.00 WELL ADULT EXAM: Primary | ICD-10-CM

## 2023-09-26 DIAGNOSIS — R21 RASH: ICD-10-CM

## 2023-09-26 DIAGNOSIS — Z13.220 LIPID SCREENING: ICD-10-CM

## 2023-09-26 DIAGNOSIS — Z13.1 SCREENING FOR DIABETES MELLITUS: ICD-10-CM

## 2023-09-26 DIAGNOSIS — I10 ESSENTIAL HYPERTENSION: ICD-10-CM

## 2023-09-26 PROCEDURE — 3077F SYST BP >= 140 MM HG: CPT | Performed by: INTERNAL MEDICINE

## 2023-09-26 PROCEDURE — 3078F DIAST BP <80 MM HG: CPT | Performed by: INTERNAL MEDICINE

## 2023-09-26 PROCEDURE — 99396 PREV VISIT EST AGE 40-64: CPT | Performed by: INTERNAL MEDICINE

## 2023-09-26 RX ORDER — ACETAMINOPHEN 500 MG
500 TABLET ORAL NIGHTLY PRN
COMMUNITY

## 2023-09-26 SDOH — ECONOMIC STABILITY: FOOD INSECURITY: WITHIN THE PAST 12 MONTHS, YOU WORRIED THAT YOUR FOOD WOULD RUN OUT BEFORE YOU GOT MONEY TO BUY MORE.: NEVER TRUE

## 2023-09-26 SDOH — ECONOMIC STABILITY: HOUSING INSECURITY
IN THE LAST 12 MONTHS, WAS THERE A TIME WHEN YOU DID NOT HAVE A STEADY PLACE TO SLEEP OR SLEPT IN A SHELTER (INCLUDING NOW)?: NO

## 2023-09-26 SDOH — ECONOMIC STABILITY: INCOME INSECURITY: HOW HARD IS IT FOR YOU TO PAY FOR THE VERY BASICS LIKE FOOD, HOUSING, MEDICAL CARE, AND HEATING?: NOT HARD AT ALL

## 2023-09-26 SDOH — ECONOMIC STABILITY: FOOD INSECURITY: WITHIN THE PAST 12 MONTHS, THE FOOD YOU BOUGHT JUST DIDN'T LAST AND YOU DIDN'T HAVE MONEY TO GET MORE.: NEVER TRUE

## 2023-09-26 ASSESSMENT — PATIENT HEALTH QUESTIONNAIRE - PHQ9
SUM OF ALL RESPONSES TO PHQ9 QUESTIONS 1 & 2: 0
2. FEELING DOWN, DEPRESSED OR HOPELESS: 0
SUM OF ALL RESPONSES TO PHQ QUESTIONS 1-9: 0
1. LITTLE INTEREST OR PLEASURE IN DOING THINGS: 0
SUM OF ALL RESPONSES TO PHQ QUESTIONS 1-9: 0

## 2023-09-26 NOTE — PROGRESS NOTES
are normal.      Palpations: Abdomen is soft. There is no mass. Tenderness: There is no abdominal tenderness. Musculoskeletal:      Right lower leg: No edema. Left lower leg: No edema. Lymphadenopathy:      Cervical: No cervical adenopathy. Skin:     General: Skin is warm and dry. Coloration: Skin is not pale. Findings: No rash. Comments: Solar lentigines. No suspicious skin lesions   Neurological:      General: No focal deficit present. Mental Status: She is alert. Psychiatric:         Mood and Affect: Mood normal.             Latest Ref Rng & Units 8/16/2022     3:18 PM 6/23/2022     2:21 PM 8/3/2021     3:25 PM   LAB PRIMARY CARE   A1C See comment %   5.4    A1C POC See comment %   5.4    GLU random 70 - 99 mg/dL 91  131  86    CHOL 0 - 199 mg/dL   199    TRIG 0 - 150 mg/dL   129    HDL 40 - 60 mg/dL   82    LDL CALC <100 mg/dL   91     - 145 mmol/L 139  140  137    K 3.5 - 5.1 mmol/L 4.7  4.1  5.3    BUN 7 - 20 mg/dL 18  15  15    CR 0.6 - 1.1 mg/dL 0.7  0.6  0.6    CA 8.3 - 10.6 mg/dL 10.5  10.3  9.8    ALT 10 - 40 U/L  14     AST 15 - 37 U/L  19     TSH 0.27 - 4.20 uIU/mL 1.40   1.74    HGB 12.0 - 16.0 g/dL  13.8       Lab Results   Component Value Date/Time    CHOL 199 08/03/2021 03:25 PM    TRIG 129 08/03/2021 03:25 PM    HDL 82 08/03/2021 03:25 PM    HDL 80 05/02/2012 09:35 AM    LDLCALC 91 08/03/2021 03:25 PM    GLUCOSE 91 08/16/2022 03:18 PM    LABA1C 5.4 08/03/2021 03:25 PM       The 10-year ASCVD risk score (Gamaliel VEGA, et al., 2019) is: 4%    Values used to calculate the score:      Age: 59 years      Sex: Female      Is Non- : No      Diabetic: No      Tobacco smoker: No      Systolic Blood Pressure: 182 mmHg      Is BP treated: Yes      HDL Cholesterol: 82 mg/dL      Total Cholesterol: 199 mg/dL      An electronic signature was used to authenticate this note.     --Dyan Ramesh MD

## 2023-09-27 LAB
ANA SER QL IA: POSITIVE
ANION GAP SERPL CALCULATED.3IONS-SCNC: 13 MMOL/L (ref 3–16)
BUN SERPL-MCNC: 17 MG/DL (ref 7–20)
CALCIUM SERPL-MCNC: 9.7 MG/DL (ref 8.3–10.6)
CENTROMERE B IGG SER QL LINE BLOT: <0.2 AI (ref 0–0.9)
CHLORIDE SERPL-SCNC: 99 MMOL/L (ref 99–110)
CHOLEST SERPL-MCNC: 205 MG/DL (ref 0–199)
CHROMATIN AB SERPL-ACNC: <0.2 AI (ref 0–0.9)
CO2 SERPL-SCNC: 24 MMOL/L (ref 21–32)
CREAT SERPL-MCNC: 0.8 MG/DL (ref 0.6–1.2)
DEPRECATED RDW RBC AUTO: 12.9 % (ref 12.4–15.4)
DSDNA AB SER-ACNC: 1 IU/ML (ref 0–9)
ENA JO1 AB SER IA-ACNC: <0.2 AI (ref 0–0.9)
ENA RNP AB SER IA-ACNC: 1 AI (ref 0–0.9)
ENA SCL70 IGG SER IA-ACNC: <0.2 AI (ref 0–0.9)
ENA SM AB SER IA-ACNC: <0.2 AI (ref 0–0.9)
ENA SM+RNP IGG SER-ACNC: <0.2 AI (ref 0–0.9)
ENA SS-A AB SER IA-ACNC: <0.2 AI (ref 0–0.9)
ENA SS-B AB SER IA-ACNC: <0.2 AI (ref 0–0.9)
GFR SERPLBLD CREATININE-BSD FMLA CKD-EPI: >60 ML/MIN/{1.73_M2}
GLUCOSE SERPL-MCNC: 91 MG/DL (ref 70–99)
HCT VFR BLD AUTO: 39.7 % (ref 36–48)
HDLC SERPL-MCNC: 80 MG/DL (ref 40–60)
HGB BLD-MCNC: 13.6 G/DL (ref 12–16)
LDLC SERPL CALC-MCNC: 90 MG/DL
MCH RBC QN AUTO: 31.3 PG (ref 26–34)
MCHC RBC AUTO-ENTMCNC: 34.3 G/DL (ref 31–36)
MCV RBC AUTO: 91.3 FL (ref 80–100)
PLATELET # BLD AUTO: 353 K/UL (ref 135–450)
PMV BLD AUTO: 8.8 FL (ref 5–10.5)
POTASSIUM SERPL-SCNC: 4.3 MMOL/L (ref 3.5–5.1)
RBC # BLD AUTO: 4.35 M/UL (ref 4–5.2)
RIBOSOMAL P AB SER IA-ACNC: <0.2 AI (ref 0–0.9)
SODIUM SERPL-SCNC: 136 MMOL/L (ref 136–145)
TRIGL SERPL-MCNC: 173 MG/DL (ref 0–150)
TSH SERPL DL<=0.005 MIU/L-ACNC: 1.47 UIU/ML (ref 0.27–4.2)
VIT B12 SERPL-MCNC: 924 PG/ML (ref 211–911)
VLDLC SERPL CALC-MCNC: 35 MG/DL
WBC # BLD AUTO: 6.1 K/UL (ref 4–11)

## 2023-09-27 NOTE — ASSESSMENT & PLAN NOTE
Chronic problem, borderline control. Discussed potential opportunities for lifestyle modification for her. Monitor with alcohol -free trial and lower sodium diet (read labels).   Reevaluate in approx 4 months

## 2023-10-06 LAB
ANTINUCLEAR AB INTERPRETIVE COMMENT: NORMAL
NUCLEAR IGG SER QL IF: NORMAL

## 2023-11-15 RX ORDER — TRIAMTERENE AND HYDROCHLOROTHIAZIDE 37.5; 25 MG/1; MG/1
CAPSULE ORAL
Qty: 90 CAPSULE | Refills: 0 | Status: SHIPPED | OUTPATIENT
Start: 2023-11-15

## 2023-11-15 NOTE — TELEPHONE ENCOUNTER
Requested Prescriptions     Pending Prescriptions Disp Refills    triamterene-hydroCHLOROthiazide (DYAZIDE) 37.5-25 MG per capsule [Pharmacy Med Name: TRIAMTERENE/HCTZ CAPS 37.5/25MG] 90 capsule 3     Sig: TAKE 1 CAPSULE EVERY MORNING     Last OV - 9/26/23  Next OV - 1/30/24  Last refill - 8/17/23  Last labs - 9/26/23

## 2024-01-30 ENCOUNTER — OFFICE VISIT (OUTPATIENT)
Dept: INTERNAL MEDICINE CLINIC | Age: 59
End: 2024-01-30
Payer: COMMERCIAL

## 2024-01-30 VITALS
HEART RATE: 88 BPM | WEIGHT: 137.8 LBS | HEIGHT: 67 IN | BODY MASS INDEX: 21.63 KG/M2 | DIASTOLIC BLOOD PRESSURE: 70 MMHG | SYSTOLIC BLOOD PRESSURE: 122 MMHG | OXYGEN SATURATION: 98 %

## 2024-01-30 DIAGNOSIS — R04.0 EPISTAXIS, RECURRENT: ICD-10-CM

## 2024-01-30 DIAGNOSIS — I10 ESSENTIAL HYPERTENSION: Primary | ICD-10-CM

## 2024-01-30 PROCEDURE — 3078F DIAST BP <80 MM HG: CPT | Performed by: INTERNAL MEDICINE

## 2024-01-30 PROCEDURE — 99213 OFFICE O/P EST LOW 20 MIN: CPT | Performed by: INTERNAL MEDICINE

## 2024-01-30 PROCEDURE — 3074F SYST BP LT 130 MM HG: CPT | Performed by: INTERNAL MEDICINE

## 2024-01-30 RX ORDER — TRIAMTERENE AND HYDROCHLOROTHIAZIDE 37.5; 25 MG/1; MG/1
1 CAPSULE ORAL EVERY MORNING
Qty: 90 CAPSULE | Refills: 2 | Status: SHIPPED | OUTPATIENT
Start: 2024-01-30

## 2024-01-30 ASSESSMENT — PATIENT HEALTH QUESTIONNAIRE - PHQ9
SUM OF ALL RESPONSES TO PHQ QUESTIONS 1-9: 0
2. FEELING DOWN, DEPRESSED OR HOPELESS: 0
SUM OF ALL RESPONSES TO PHQ QUESTIONS 1-9: 0
1. LITTLE INTEREST OR PLEASURE IN DOING THINGS: 0
SUM OF ALL RESPONSES TO PHQ9 QUESTIONS 1 & 2: 0

## 2024-01-30 NOTE — ASSESSMENT & PLAN NOTE
Chronic problem, controlled.  Continue Maxide 25.  Bring home cuff to next appointment for correlation, as I believe it likely is running high.

## 2024-01-30 NOTE — PROGRESS NOTES
Mabel Montero   :  1965  2024    ASSESSMENT/PLAN:   Essential hypertension  Assessment & Plan:  Chronic problem, controlled.  Continue Maxide 25.  Bring home cuff to next appointment for correlation, as I believe it likely is running high.  Orders:  -     [facility] A Quintanilla, , Otolaryngology, [place]  Epistaxis, recurrent  Comments:  Referral to ENT for eval.  Unable to visualize, but caused bleeding with exam.      Return in about 6 months (around 2024) for CPE, HTN.        SUBJECTIVE     58 y.o. female established patient here for: Follow-up and Hypertension    Home blood pressures, usually 130-140s. Has not had cuff correlated any larger.     Epistaxis    Review of Systems    Outpatient Medications Marked as Taking for the 24 encounter (Office Visit) with Chelsy Calle MD   Medication Sig Dispense Refill    triamterene-hydroCHLOROthiazide (DYAZIDE) 37.5-25 MG per capsule Take 1 capsule by mouth every morning 90 capsule 2    acetaminophen (TYLENOL) 500 MG tablet Take 1 tablet by mouth nightly as needed      BIOTIN PO Take by mouth       Cholecalciferol (VITAMIN D3 PO) Take by mouth      Multiple Vitamin (MULTIVITAMIN PO) Take  by mouth daily.         OBJECTIVE:  Vitals:    24 0924   BP: 122/70   Site: Right Upper Arm   Position: Sitting   Cuff Size: Medium Adult   Pulse: 88   SpO2: 98%   Weight: 62.5 kg (137 lb 12.8 oz)   Height: 1.689 m (5' 6.5\")     Physical Exam  Constitutional:       Appearance: Normal appearance.   HENT:      Nose:      Comments: Nasal septum normal, source of bleeding just inside right nostril at 6:00  Cardiovascular:      Rate and Rhythm: Normal rate and regular rhythm.   Pulmonary:      Effort: Pulmonary effort is normal.      Breath sounds: Normal breath sounds.   Musculoskeletal:      Right lower leg: No edema.      Left lower leg: No edema.         This note was generated completely or in part utilizing Dragon dictation speech recognition

## 2024-02-16 ENCOUNTER — PATIENT MESSAGE (OUTPATIENT)
Dept: INTERNAL MEDICINE CLINIC | Age: 59
End: 2024-02-16

## 2024-02-16 NOTE — TELEPHONE ENCOUNTER
This PA team received a refill request VIA fax.  The patient needs a refill of TRIAMTERENE HCTZ CAPS.  The refill request is available in Media.    If this requires a response please respond to the pool ( P [facility] MEDICATION PRE-AUTH).      Thank you please advise patient.

## 2024-02-16 NOTE — TELEPHONE ENCOUNTER
Last appointment: 1/30/2024  Next appointment: Visit date not found (Due 7/30/24)  Last refill: 1/30/24

## 2024-02-18 RX ORDER — TRIAMTERENE AND HYDROCHLOROTHIAZIDE 37.5; 25 MG/1; MG/1
1 CAPSULE ORAL EVERY MORNING
Qty: 90 CAPSULE | Refills: 2 | Status: SHIPPED | OUTPATIENT
Start: 2024-02-18

## 2024-02-20 RX ORDER — TRIAMTERENE AND HYDROCHLOROTHIAZIDE 37.5; 25 MG/1; MG/1
1 CAPSULE ORAL EVERY MORNING
Qty: 90 CAPSULE | Refills: 1 | Status: SHIPPED | OUTPATIENT
Start: 2024-02-20

## 2024-02-20 NOTE — TELEPHONE ENCOUNTER
Last appointment: 1/30/2024  Next appointment: Visit date not found. Due in July  Last dispensed: 11/15/2023

## 2024-04-01 SDOH — HEALTH STABILITY: PHYSICAL HEALTH: ON AVERAGE, HOW MANY MINUTES DO YOU ENGAGE IN EXERCISE AT THIS LEVEL?: 30 MIN

## 2024-04-01 SDOH — HEALTH STABILITY: PHYSICAL HEALTH: ON AVERAGE, HOW MANY DAYS PER WEEK DO YOU ENGAGE IN MODERATE TO STRENUOUS EXERCISE (LIKE A BRISK WALK)?: 4 DAYS

## 2024-04-02 ENCOUNTER — OFFICE VISIT (OUTPATIENT)
Dept: FAMILY MEDICINE CLINIC | Age: 59
End: 2024-04-02
Payer: COMMERCIAL

## 2024-04-02 VITALS
HEIGHT: 66 IN | TEMPERATURE: 97.7 F | BODY MASS INDEX: 22.56 KG/M2 | HEART RATE: 88 BPM | WEIGHT: 140.4 LBS | SYSTOLIC BLOOD PRESSURE: 122 MMHG | DIASTOLIC BLOOD PRESSURE: 80 MMHG | OXYGEN SATURATION: 97 %

## 2024-04-02 DIAGNOSIS — H91.93: ICD-10-CM

## 2024-04-02 DIAGNOSIS — L84 CALLUS: ICD-10-CM

## 2024-04-02 DIAGNOSIS — I10 ESSENTIAL HYPERTENSION: Primary | ICD-10-CM

## 2024-04-02 DIAGNOSIS — R04.0 EPISTAXIS: ICD-10-CM

## 2024-04-02 PROCEDURE — 99204 OFFICE O/P NEW MOD 45 MIN: CPT | Performed by: FAMILY MEDICINE

## 2024-04-02 PROCEDURE — 3079F DIAST BP 80-89 MM HG: CPT | Performed by: FAMILY MEDICINE

## 2024-04-02 PROCEDURE — 3074F SYST BP LT 130 MM HG: CPT | Performed by: FAMILY MEDICINE

## 2024-04-02 NOTE — PROGRESS NOTES
daily      triamterene-hydroCHLOROthiazide (DYAZIDE) 37.5-25 MG per capsule TAKE 1 CAPSULE EVERY MORNING 90 capsule 1    acetaminophen (TYLENOL) 500 MG tablet Take 1 tablet by mouth nightly as needed      Cholecalciferol (VITAMIN D3 PO) Take by mouth      Multiple Vitamin (MULTIVITAMIN PO) Take  by mouth daily.       No current facility-administered medications for this visit.       No Known Allergies      Past Medical History:   Diagnosis Date    Fe deficiency anemia     Fibroids     HSV-2 infection          History reviewed. No pertinent surgical history.      Family History   Problem Relation Age of Onset    Hypertension Mother     High Cholesterol Mother     Coronary Art Dis Father         MI, CABG, defibrillator,  at 85 with CHF    High Cholesterol Father     Diabetes Father 84    No Known Problems Sister     No Known Problems Sister     Colon Polyps Brother     Other Brother         congenital unilateral kidney    Ulcerative Colitis Brother     Crohn's Disease Maternal Aunt         Social History     Socioeconomic History    Marital status:      Spouse name: None    Number of children: 0    Years of education: None    Highest education level: None   Occupational History    Occupation: [employer]   Tobacco Use    Smoking status: Never    Smokeless tobacco: Never   Substance and Sexual Activity    Alcohol use: Yes     Alcohol/week: 7.0 standard drinks of alcohol     Types: 7 Cans of beer per week     Comment: 1 beer daily    Drug use: Yes     Comment: Marijuana 1-2x/month    Sexual activity: Yes     Partners: Male   Social History Narrative    Exercise:    Active job and tries to walk     Social Determinants of Health     Financial Resource Strain: Low Risk  (2023)    Overall Financial Resource Strain (CARDIA)     Difficulty of Paying Living Expenses: Not hard at all   Transportation Needs: Unknown (2023)    PRAPARE - Transportation     Lack of Transportation (Non-Medical): No   Physical

## 2024-04-06 ENCOUNTER — PATIENT MESSAGE (OUTPATIENT)
Dept: INTERNAL MEDICINE CLINIC | Age: 59
End: 2024-04-06

## 2024-04-06 DIAGNOSIS — R04.0 EPISTAXIS: Primary | ICD-10-CM

## 2024-05-07 ENCOUNTER — HOSPITAL ENCOUNTER (OUTPATIENT)
Dept: WOMENS IMAGING | Age: 59
Discharge: HOME | End: 2024-05-07
Payer: COMMERCIAL

## 2024-05-07 DIAGNOSIS — Z12.31 ENCOUNTER FOR SCREENING MAMMOGRAM FOR BREAST CANCER: ICD-10-CM

## 2024-05-07 PROCEDURE — 77063 BREAST TOMOSYNTHESIS BI: CPT

## 2024-05-10 NOTE — PROGRESS NOTES
Mabel Montero   1965, 59 y.o. female   [id number]       Referring Provider: Shobha Garibay MD   Referral Type: In an order in Epic    Reason for Visit: Evaluation of suspected change in hearing, tinnitus, or balance.    ADULT AUDIOLOGIC EVALUATION      Mabel Montero is a 59 y.o. female seen today, 5/13/2024 , for an initial audiologic evaluation.     AUDIOLOGIC AND OTHER PERTINENT MEDICAL HISTORY:      Mabel Montero reports wanting a baseline hearing test. She denied any concerns for her hearing. Patient has a father with hearing loss in one ear due to Scarlet Fever. She endorsed a history of recreational noise exposure.     She denied otalgia, aural fullness, otorrhea, tinnitus, dizziness, imbalance, history of falls, history of head trauma, and history of ear surgery    Date: 5/13/2024     IMPRESSIONS:      Today's results revealed Normal hearing 250-8000 Hz bilaterally. Excellent speech understanding when in quiet. Tympanometry indicates normal middle ear function. Discussed test results and implications with patient.    Follow medical recommendations of Shobha Garibay MD .     ASSESSMENT AND FINDINGS:     Otoscopy unremarkable.    RIGHT EAR:  Hearing Sensitivity: Normal hearing sensitivity 250-8000 Hz  Speech Recognition Threshold: 10 dB HL  Word Recognition: Excellent 100%, based on NU-6 by-difficulty list at 50 dBHL using recorded speech stimuli.    Tympanometry: Normal peak pressure and compliance, Type A tympanogram, consistent with normal middle ear function.       LEFT EAR:  Hearing Sensitivity: Normal hearing sensitivity 250-8000 Hz  Speech Recognition Threshold: 10 dB HL  Word Recognition: Excellent 100%, based on NU-6 by-difficulty list at 50 dBHL using recorded speech stimuli.    Tympanometry: Normal peak pressure and compliance, Type A tympanogram, consistent with normal middle ear function.       Reliability: Good   Transducer: HF Headphones    See scanned audiogram dated 5/13/2024 for

## 2024-05-13 ENCOUNTER — PROCEDURE VISIT (OUTPATIENT)
Dept: AUDIOLOGY | Age: 59
End: 2024-05-13
Payer: COMMERCIAL

## 2024-05-13 DIAGNOSIS — Z01.10 NORMAL HEARING TEST: Primary | ICD-10-CM

## 2024-05-13 PROCEDURE — 92567 TYMPANOMETRY: CPT

## 2024-05-13 PROCEDURE — 92557 COMPREHENSIVE HEARING TEST: CPT

## 2024-08-12 SDOH — HEALTH STABILITY: PHYSICAL HEALTH: ON AVERAGE, HOW MANY DAYS PER WEEK DO YOU ENGAGE IN MODERATE TO STRENUOUS EXERCISE (LIKE A BRISK WALK)?: 4 DAYS

## 2024-08-13 ENCOUNTER — OFFICE VISIT (OUTPATIENT)
Dept: INTERNAL MEDICINE CLINIC | Age: 59
End: 2024-08-13
Payer: COMMERCIAL

## 2024-08-13 VITALS
HEART RATE: 71 BPM | SYSTOLIC BLOOD PRESSURE: 128 MMHG | WEIGHT: 135 LBS | BODY MASS INDEX: 21.79 KG/M2 | DIASTOLIC BLOOD PRESSURE: 72 MMHG | OXYGEN SATURATION: 98 %

## 2024-08-13 DIAGNOSIS — Z78.0 POSTMENOPAUSAL: ICD-10-CM

## 2024-08-13 DIAGNOSIS — I10 ESSENTIAL HYPERTENSION: Primary | ICD-10-CM

## 2024-08-13 PROCEDURE — 3074F SYST BP LT 130 MM HG: CPT | Performed by: STUDENT IN AN ORGANIZED HEALTH CARE EDUCATION/TRAINING PROGRAM

## 2024-08-13 PROCEDURE — 99215 OFFICE O/P EST HI 40 MIN: CPT | Performed by: STUDENT IN AN ORGANIZED HEALTH CARE EDUCATION/TRAINING PROGRAM

## 2024-08-13 PROCEDURE — 3078F DIAST BP <80 MM HG: CPT | Performed by: STUDENT IN AN ORGANIZED HEALTH CARE EDUCATION/TRAINING PROGRAM

## 2024-08-13 ASSESSMENT — ENCOUNTER SYMPTOMS
EYES NEGATIVE: 1
EYE DISCHARGE: 0
SHORTNESS OF BREATH: 0
ABDOMINAL PAIN: 0
ALLERGIC/IMMUNOLOGIC NEGATIVE: 1
RESPIRATORY NEGATIVE: 1
GASTROINTESTINAL NEGATIVE: 1

## 2024-08-13 NOTE — PROGRESS NOTES
Mabel Montero (:  1965) is a 59 y.o. female,New patient, here for evaluation of the following chief complaint(s):  New Patient    Patient with a past medical history significant for HTN, vaginal polyp removal.      H/o rash in lower extremity- NICOLE was positive, rash resolved with steroids.  No further workup, transient elevation in NICOLE.    Father- MI, CABG.   Brother- UC and polyps.  Maternal Aunt- Crohn disease.    Colonoscopy- 2024- normal, f/u in 10 yrs. upto date    Marijuana every several days. Beer 1 beer per day.       Assessment & Plan  Essential hypertension   Well-controlled, continue current medications         Postmenopausal    Will order for a DEXA scan, continue with vitamin D.    Orders:    DEXA BONE DENSITY 2 SITES; Future      Return in about 6 months (around 2025) for HTN.       Subjective   HPI    Review of Systems   Constitutional: Negative.  Negative for chills and fever.   HENT: Negative.     Eyes: Negative.  Negative for discharge.   Respiratory: Negative.  Negative for shortness of breath.    Cardiovascular: Negative.  Negative for chest pain and palpitations.   Gastrointestinal: Negative.  Negative for abdominal pain.   Endocrine: Negative.    Genitourinary: Negative.    Musculoskeletal: Negative.    Skin: Negative.    Allergic/Immunologic: Negative.    Neurological: Negative.  Negative for dizziness and headaches.   Hematological: Negative.    Psychiatric/Behavioral: Negative.            Objective   Physical Exam  Vitals reviewed.   Constitutional:       Appearance: Normal appearance.   HENT:      Head: Normocephalic.   Eyes:      Extraocular Movements: Extraocular movements intact.      Pupils: Pupils are equal, round, and reactive to light.   Cardiovascular:      Rate and Rhythm: Normal rate.      Heart sounds: Normal heart sounds. No murmur heard.  Pulmonary:      Effort: Pulmonary effort is normal.      Breath sounds: Normal breath sounds.   Abdominal:      General:

## 2024-08-20 ENCOUNTER — PATIENT MESSAGE (OUTPATIENT)
Dept: INTERNAL MEDICINE CLINIC | Age: 59
End: 2024-08-20

## 2024-08-20 RX ORDER — TRIAMTERENE AND HYDROCHLOROTHIAZIDE 37.5; 25 MG/1; MG/1
1 CAPSULE ORAL EVERY MORNING
Qty: 90 CAPSULE | Refills: 1 | Status: SHIPPED | OUTPATIENT
Start: 2024-08-20

## 2024-10-17 ENCOUNTER — TELEPHONE (OUTPATIENT)
Dept: INTERNAL MEDICINE CLINIC | Age: 59
End: 2024-10-17

## 2024-10-23 DIAGNOSIS — I10 ESSENTIAL HYPERTENSION: Primary | ICD-10-CM

## 2024-10-23 RX ORDER — TRIAMTERENE AND HYDROCHLOROTHIAZIDE 37.5; 25 MG/1; MG/1
1 CAPSULE ORAL EVERY MORNING
Qty: 90 CAPSULE | Refills: 1 | Status: SHIPPED | OUTPATIENT
Start: 2024-10-23

## 2024-10-23 NOTE — TELEPHONE ENCOUNTER
Medication:   Requested Prescriptions     Pending Prescriptions Disp Refills    triamterene-hydroCHLOROthiazide (DYAZIDE) 37.5-25 MG per capsule 90 capsule 1     Sig: Take 1 capsule by mouth every morning       Last Filled:      Patient Phone Number: [phone] (home)     Last appt: 8/13/2024   Next appt: 11/19/2024  Future Appointments   Date Time Provider Department Center   11/19/2024  3:30 PM Basilio Arias MD [facility] DE[facility]

## 2024-11-18 NOTE — PROGRESS NOTES
Well Adult Note  Name: Mabel Montero Today’s Date: 2024   MRN: [id number] Sex: Female   Age: 59 y.o. Ethnicity: Non- / Non    : 1965 Race: White (non-)      Mabel Montero is here for a well adult exam.       Assessment & Plan   Encounter for well adult exam without abnormal findings  Discussed age appropriate preventive care including healthy diet, daily exercise, immunizations and age & gender guided screening tests.   Labs ordered.  -     Comprehensive Metabolic Panel; Future  -     Lipid, Fasting; Future  -     Hemoglobin A1C; Future  -     TSH with Reflex; Future  Essential hypertension controlled, continue the current dose of Dyazide 37.5/25 mg daily.  Labs ordered.  -     Comprehensive Metabolic Panel; Future  -     Lipid, Fasting; Future  -     TSH with Reflex; Future  Elevated blood sugar need to screen for diabetes, labs ordered.  Educated on diet.  Currently on vegetarian diet.  25 positive for protein.  -     Hemoglobin A1C; Future  Vitamin D deficiency has been taking vitamin D 1000 mg daily, labs ordered to trend  -     Vitamin D 25 Hydroxy      Return in about 6 months (around 2025).       Subjective   History:  Patient with a past medical history significant for HTN, vaginal polyp removal.      H/o rash in lower extremity- NICOLE was positive, rash resolved with steroids.  No further workup, transient elevation in NICOLE.    He was seen in the neck , normal.    Father- MI, CABG.   Brother- UC and polyps.  Maternal Aunt- Crohn disease.    Colonoscopy- 2024- normal, f/u in 10 yrs. upto date    Marijuana every several days. Beer 1 beer per day.    Review of Systems   Constitutional: Negative.  Negative for chills and fever.   HENT: Negative.     Eyes: Negative.  Negative for discharge.   Respiratory: Negative.  Negative for shortness of breath.    Cardiovascular: Negative.  Negative for chest pain and palpitations.   Gastrointestinal: Negative.  Negative for

## 2024-11-19 ENCOUNTER — OFFICE VISIT (OUTPATIENT)
Dept: INTERNAL MEDICINE CLINIC | Age: 59
End: 2024-11-19

## 2024-11-19 VITALS
DIASTOLIC BLOOD PRESSURE: 70 MMHG | HEART RATE: 77 BPM | WEIGHT: 135 LBS | BODY MASS INDEX: 21.79 KG/M2 | OXYGEN SATURATION: 97 % | SYSTOLIC BLOOD PRESSURE: 120 MMHG

## 2024-11-19 DIAGNOSIS — R73.9 ELEVATED BLOOD SUGAR: ICD-10-CM

## 2024-11-19 DIAGNOSIS — Z00.00 ENCOUNTER FOR WELL ADULT EXAM WITHOUT ABNORMAL FINDINGS: Primary | ICD-10-CM

## 2024-11-19 DIAGNOSIS — E55.9 VITAMIN D DEFICIENCY: ICD-10-CM

## 2024-11-19 DIAGNOSIS — I10 ESSENTIAL HYPERTENSION: ICD-10-CM

## 2024-11-19 SDOH — ECONOMIC STABILITY: FOOD INSECURITY: WITHIN THE PAST 12 MONTHS, THE FOOD YOU BOUGHT JUST DIDN'T LAST AND YOU DIDN'T HAVE MONEY TO GET MORE.: NEVER TRUE

## 2024-11-19 SDOH — ECONOMIC STABILITY: INCOME INSECURITY: HOW HARD IS IT FOR YOU TO PAY FOR THE VERY BASICS LIKE FOOD, HOUSING, MEDICAL CARE, AND HEATING?: NOT HARD AT ALL

## 2024-11-19 SDOH — ECONOMIC STABILITY: FOOD INSECURITY: WITHIN THE PAST 12 MONTHS, YOU WORRIED THAT YOUR FOOD WOULD RUN OUT BEFORE YOU GOT MONEY TO BUY MORE.: NEVER TRUE

## 2024-11-19 ASSESSMENT — ENCOUNTER SYMPTOMS
GASTROINTESTINAL NEGATIVE: 1
ALLERGIC/IMMUNOLOGIC NEGATIVE: 1
EYES NEGATIVE: 1
EYE DISCHARGE: 0
SHORTNESS OF BREATH: 0
ABDOMINAL PAIN: 0
RESPIRATORY NEGATIVE: 1

## 2024-11-22 ENCOUNTER — LAB (OUTPATIENT)
Dept: INTERNAL MEDICINE CLINIC | Age: 59
End: 2024-11-22
Payer: COMMERCIAL

## 2024-11-22 DIAGNOSIS — Z00.00 ENCOUNTER FOR WELL ADULT EXAM WITHOUT ABNORMAL FINDINGS: ICD-10-CM

## 2024-11-22 DIAGNOSIS — R73.9 ELEVATED BLOOD SUGAR: ICD-10-CM

## 2024-11-22 DIAGNOSIS — I10 ESSENTIAL HYPERTENSION: ICD-10-CM

## 2024-11-22 LAB
ALBUMIN SERPL-MCNC: 4.5 G/DL (ref 3.4–5)
ALBUMIN/GLOB SERPL: 1.7 {RATIO} (ref 1.1–2.2)
ALP SERPL-CCNC: 80 U/L (ref 40–129)
ALT SERPL-CCNC: 16 U/L (ref 10–40)
ANION GAP SERPL CALCULATED.3IONS-SCNC: 11 MMOL/L (ref 3–16)
AST SERPL-CCNC: 23 U/L (ref 15–37)
BILIRUB SERPL-MCNC: 0.4 MG/DL (ref 0–1)
BUN SERPL-MCNC: 15 MG/DL (ref 7–20)
CALCIUM SERPL-MCNC: 10.2 MG/DL (ref 8.3–10.6)
CHLORIDE SERPL-SCNC: 99 MMOL/L (ref 99–110)
CHOLEST SERPL-MCNC: 181 MG/DL (ref 0–199)
CO2 SERPL-SCNC: 26 MMOL/L (ref 21–32)
CREAT SERPL-MCNC: 0.7 MG/DL (ref 0.6–1.2)
EST. AVERAGE GLUCOSE BLD GHB EST-MCNC: 105.4 MG/DL
GFR SERPLBLD CREATININE-BSD FMLA CKD-EPI: >90 ML/MIN/{1.73_M2}
GLUCOSE SERPL-MCNC: 87 MG/DL (ref 70–99)
HBA1C MFR BLD: 5.3 %
HDLC SERPL-MCNC: 78 MG/DL (ref 40–60)
LDL CHOLESTEROL: 89 MG/DL
POTASSIUM SERPL-SCNC: 4.2 MMOL/L (ref 3.5–5.1)
PROT SERPL-MCNC: 7.2 G/DL (ref 6.4–8.2)
SODIUM SERPL-SCNC: 136 MMOL/L (ref 136–145)
TRIGL SERPL-MCNC: 70 MG/DL (ref 0–150)
TSH SERPL DL<=0.005 MIU/L-ACNC: 1.01 UIU/ML (ref 0.27–4.2)
VLDLC SERPL CALC-MCNC: 14 MG/DL

## 2024-11-22 PROCEDURE — 36415 COLL VENOUS BLD VENIPUNCTURE: CPT | Performed by: STUDENT IN AN ORGANIZED HEALTH CARE EDUCATION/TRAINING PROGRAM

## 2025-04-03 DIAGNOSIS — I10 ESSENTIAL HYPERTENSION: ICD-10-CM

## 2025-04-03 RX ORDER — TRIAMTERENE AND HYDROCHLOROTHIAZIDE 37.5; 25 MG/1; MG/1
1 CAPSULE ORAL EVERY MORNING
Qty: 90 CAPSULE | Refills: 1 | Status: SHIPPED | OUTPATIENT
Start: 2025-04-03

## 2025-07-02 ENCOUNTER — HOSPITAL ENCOUNTER (OUTPATIENT)
Dept: WOMENS IMAGING | Age: 60
Discharge: HOME | End: 2025-07-02
Payer: COMMERCIAL

## 2025-07-02 ENCOUNTER — RESULTS FOLLOW-UP (OUTPATIENT)
Dept: INTERNAL MEDICINE CLINIC | Age: 60
End: 2025-07-02

## 2025-07-02 VITALS — WEIGHT: 132 LBS | HEIGHT: 66 IN | BODY MASS INDEX: 21.21 KG/M2

## 2025-07-02 DIAGNOSIS — Z12.31 VISIT FOR SCREENING MAMMOGRAM: ICD-10-CM

## 2025-07-02 PROCEDURE — 77063 BREAST TOMOSYNTHESIS BI: CPT

## 2025-07-22 ENCOUNTER — HOSPITAL ENCOUNTER (OUTPATIENT)
Dept: WOMENS IMAGING | Age: 60
Discharge: HOME | End: 2025-07-22
Payer: COMMERCIAL

## 2025-07-22 DIAGNOSIS — Z78.0 POSTMENOPAUSAL: ICD-10-CM

## 2025-07-22 PROCEDURE — 77080 DXA BONE DENSITY AXIAL: CPT

## 2025-08-02 SDOH — ECONOMIC STABILITY: FOOD INSECURITY: WITHIN THE PAST 12 MONTHS, THE FOOD YOU BOUGHT JUST DIDN'T LAST AND YOU DIDN'T HAVE MONEY TO GET MORE.: NEVER TRUE

## 2025-08-02 SDOH — ECONOMIC STABILITY: INCOME INSECURITY: IN THE LAST 12 MONTHS, WAS THERE A TIME WHEN YOU WERE NOT ABLE TO PAY THE MORTGAGE OR RENT ON TIME?: NO

## 2025-08-02 SDOH — ECONOMIC STABILITY: FOOD INSECURITY: WITHIN THE PAST 12 MONTHS, YOU WORRIED THAT YOUR FOOD WOULD RUN OUT BEFORE YOU GOT MONEY TO BUY MORE.: NEVER TRUE

## 2025-08-02 SDOH — ECONOMIC STABILITY: TRANSPORTATION INSECURITY
IN THE PAST 12 MONTHS, HAS LACK OF TRANSPORTATION KEPT YOU FROM MEETINGS, WORK, OR FROM GETTING THINGS NEEDED FOR DAILY LIVING?: NO

## 2025-08-02 SDOH — ECONOMIC STABILITY: TRANSPORTATION INSECURITY
IN THE PAST 12 MONTHS, HAS THE LACK OF TRANSPORTATION KEPT YOU FROM MEDICAL APPOINTMENTS OR FROM GETTING MEDICATIONS?: NO

## 2025-08-02 ASSESSMENT — PATIENT HEALTH QUESTIONNAIRE - PHQ9
SUM OF ALL RESPONSES TO PHQ QUESTIONS 1-9: 0
SUM OF ALL RESPONSES TO PHQ9 QUESTIONS 1 & 2: 0
1. LITTLE INTEREST OR PLEASURE IN DOING THINGS: NOT AT ALL
SUM OF ALL RESPONSES TO PHQ QUESTIONS 1-9: 0
SUM OF ALL RESPONSES TO PHQ QUESTIONS 1-9: 0
2. FEELING DOWN, DEPRESSED OR HOPELESS: NOT AT ALL
SUM OF ALL RESPONSES TO PHQ QUESTIONS 1-9: 0
1. LITTLE INTEREST OR PLEASURE IN DOING THINGS: NOT AT ALL
2. FEELING DOWN, DEPRESSED OR HOPELESS: NOT AT ALL

## 2025-08-04 ASSESSMENT — ENCOUNTER SYMPTOMS
EYE DISCHARGE: 0
ALLERGIC/IMMUNOLOGIC NEGATIVE: 1
GASTROINTESTINAL NEGATIVE: 1
EYES NEGATIVE: 1
SHORTNESS OF BREATH: 0
ABDOMINAL PAIN: 0
RESPIRATORY NEGATIVE: 1

## 2025-08-05 ENCOUNTER — OFFICE VISIT (OUTPATIENT)
Dept: INTERNAL MEDICINE CLINIC | Age: 60
End: 2025-08-05
Payer: COMMERCIAL

## 2025-08-05 VITALS
HEART RATE: 93 BPM | BODY MASS INDEX: 21.47 KG/M2 | DIASTOLIC BLOOD PRESSURE: 60 MMHG | OXYGEN SATURATION: 99 % | WEIGHT: 133 LBS | SYSTOLIC BLOOD PRESSURE: 112 MMHG

## 2025-08-05 DIAGNOSIS — M81.0 AGE-RELATED OSTEOPOROSIS WITHOUT CURRENT PATHOLOGICAL FRACTURE: Primary | ICD-10-CM

## 2025-08-05 DIAGNOSIS — I10 ESSENTIAL HYPERTENSION: ICD-10-CM

## 2025-08-05 PROCEDURE — 3078F DIAST BP <80 MM HG: CPT | Performed by: STUDENT IN AN ORGANIZED HEALTH CARE EDUCATION/TRAINING PROGRAM

## 2025-08-05 PROCEDURE — 3074F SYST BP LT 130 MM HG: CPT | Performed by: STUDENT IN AN ORGANIZED HEALTH CARE EDUCATION/TRAINING PROGRAM

## 2025-08-05 PROCEDURE — G2211 COMPLEX E/M VISIT ADD ON: HCPCS | Performed by: STUDENT IN AN ORGANIZED HEALTH CARE EDUCATION/TRAINING PROGRAM

## 2025-08-05 PROCEDURE — 99214 OFFICE O/P EST MOD 30 MIN: CPT | Performed by: STUDENT IN AN ORGANIZED HEALTH CARE EDUCATION/TRAINING PROGRAM

## 2025-08-05 RX ORDER — ALENDRONATE SODIUM 70 MG/1
70 TABLET ORAL
Qty: 12 TABLET | Refills: 2 | Status: SHIPPED | OUTPATIENT
Start: 2025-08-05

## 2025-08-13 ENCOUNTER — TELEPHONE (OUTPATIENT)
Dept: INTERNAL MEDICINE CLINIC | Age: 60
End: 2025-08-13